# Patient Record
Sex: MALE | Race: WHITE | NOT HISPANIC OR LATINO | Employment: FULL TIME | URBAN - METROPOLITAN AREA
[De-identification: names, ages, dates, MRNs, and addresses within clinical notes are randomized per-mention and may not be internally consistent; named-entity substitution may affect disease eponyms.]

---

## 2017-12-20 ENCOUNTER — OFFICE VISIT (OUTPATIENT)
Dept: MEDICAL GROUP | Facility: LAB | Age: 42
End: 2017-12-20
Payer: COMMERCIAL

## 2017-12-20 VITALS
HEIGHT: 71 IN | WEIGHT: 216 LBS | SYSTOLIC BLOOD PRESSURE: 124 MMHG | BODY MASS INDEX: 30.24 KG/M2 | HEART RATE: 78 BPM | RESPIRATION RATE: 16 BRPM | TEMPERATURE: 98.7 F | DIASTOLIC BLOOD PRESSURE: 88 MMHG | OXYGEN SATURATION: 100 %

## 2017-12-20 DIAGNOSIS — J20.9 ACUTE BRONCHITIS, UNSPECIFIED ORGANISM: ICD-10-CM

## 2017-12-20 DIAGNOSIS — J45.21 MILD INTERMITTENT REACTIVE AIRWAY DISEASE WITH ACUTE EXACERBATION: ICD-10-CM

## 2017-12-20 DIAGNOSIS — J01.00 ACUTE NON-RECURRENT MAXILLARY SINUSITIS: ICD-10-CM

## 2017-12-20 DIAGNOSIS — E66.9 OBESITY (BMI 30-39.9): ICD-10-CM

## 2017-12-20 PROCEDURE — 99214 OFFICE O/P EST MOD 30 MIN: CPT | Performed by: NURSE PRACTITIONER

## 2017-12-20 RX ORDER — CLARITHROMYCIN 500 MG/1
500 TABLET, COATED ORAL 2 TIMES DAILY
Qty: 20 TAB | Refills: 0 | Status: SHIPPED | OUTPATIENT
Start: 2017-12-20 | End: 2017-12-30

## 2017-12-20 ASSESSMENT — PATIENT HEALTH QUESTIONNAIRE - PHQ9: CLINICAL INTERPRETATION OF PHQ2 SCORE: 0

## 2017-12-20 NOTE — PROGRESS NOTES
"Chief Complaint   Patient presents with   • URI     x 2 wks       HPI  Otto is a 42-year-old established male here with complaint of new onset wheezing, coughing, congestion, ear pressure and sinus type headaches, per patient. Symptoms have been present for 2 weeks. One week ago he went to see his pulmonologist up in [place] and was prescribed a Medrol pack as well as an albuterol inhaler. He does note that his wheezing and cough have decreased but he is concerned that he is coughing up thick, green mucus and blowing out similar mucus from his nose. He has had low-grade fevers but no chills, nausea, vomiting or diarrhea. No other associated symptoms. He does not smoke. He has 3 small children at home ranging from  to third grade. His family is not currently sick. He has been diagnosed with reactive airway disease over the past several years. He does not regularly see a pulmonologist.  On a regular basis, when he is not sick, he is able to participating in coaching soccer, exercising without wheezing or shortness of breath.      Past medical, surgical, family, and social history is reviewed and updated in Epic chart by me today.   Medications and allergies reviewed and updated in Epic chart by me today.     ROS:   As documented in history of present illness above    Exam:  Blood pressure 124/88, pulse 78, temperature 37.1 °C (98.7 °F), resp. rate 16, height 1.803 m (5' 11\"), weight 98 kg (216 lb), SpO2 100 %.  Constitutional: Alert, no distress, plus 3 vital signs  Skin:  Warm, dry, no rashes invisible areas  Eye: Equal, round and reactive, conjunctiva clear  ENMT: Lips without lesions, good dentition, oropharynx slightly erythematous without exudate or swelling. Positive bilateral maxillary sinus tenderness. Tympanic membranes are translucent bilaterally.  Neck: Trachea midline, no masses, no thyromegaly  Respiratory: Unlabored respiration. He does have expiratory rhonchi, particularly to his right lower " lobe. No wheezing is heard.   Cardiovascular: Normal rate and rhythm, no murmur, no edema  Psych: Alert, pleasant, well-groomed, normal affect    A/P:  1. Acute bronchitis, unspecified organism  -Recommend starting clarithromycin twice a day and taking it for an entire 10 days to cover for bronchitis, sinusitis and potential pneumonia. He has completed a Medrol pack and was not restarted on steroids as his wheezing has resolved. Encouraged him to use his albuterol every 4-6 hours as needed for excessive coughing, shortness of breath or wheezing.  - clarithromycin (BIAXIN) 500 MG Tab; Take 1 Tab by mouth 2 times a day for 10 days.  Dispense: 20 Tab; Refill: -We did discuss the stepwise approach to the treatment of reactive airway disease such as an albuterol inhaler initially and if it is necessary greater than twice per week, he should be on a daily inhaled corticosteroid. He will contact me if he is needing to use his albuterol greater than twice per week after his upper respiratory tract infection resolves.    2. Acute non-recurrent maxillary sinusitis  - clarithromycin (BIAXIN) 500 MG Tab; Take 1 Tab by mouth 2 times a day for 10 days.  Dispense: 20 Tab; Refill: 0    3. Obesity (BMI 30-39.9)  -Offered lab work which the patient declines. He'll email me if he changes his mind. We also discussed his blood pressure, he is monitoring this at home and states it's been around 120/85. He is working on weight loss, exercise and improving his diet.  - Patient identified as having weight management issue.  Appropriate orders and counseling given.

## 2017-12-22 RX ORDER — TOBRAMYCIN 3 MG/ML
1 SOLUTION/ DROPS OPHTHALMIC EVERY 4 HOURS
Qty: 1 BOTTLE | Refills: 0 | Status: SHIPPED | OUTPATIENT
Start: 2017-12-22 | End: 2017-12-26

## 2017-12-26 ENCOUNTER — OFFICE VISIT (OUTPATIENT)
Dept: URGENT CARE | Facility: CLINIC | Age: 42
End: 2017-12-26
Payer: COMMERCIAL

## 2017-12-26 VITALS
OXYGEN SATURATION: 98 % | SYSTOLIC BLOOD PRESSURE: 128 MMHG | BODY MASS INDEX: 29.82 KG/M2 | DIASTOLIC BLOOD PRESSURE: 94 MMHG | TEMPERATURE: 98.1 F | WEIGHT: 213 LBS | RESPIRATION RATE: 14 BRPM | HEIGHT: 71 IN | HEART RATE: 84 BPM

## 2017-12-26 DIAGNOSIS — H10.31 ACUTE BACTERIAL CONJUNCTIVITIS OF RIGHT EYE: ICD-10-CM

## 2017-12-26 PROCEDURE — 99204 OFFICE O/P NEW MOD 45 MIN: CPT | Performed by: PHYSICIAN ASSISTANT

## 2017-12-26 RX ORDER — TOBRAMYCIN AND DEXAMETHASONE 3; 1 MG/ML; MG/ML
1 SUSPENSION/ DROPS OPHTHALMIC
Qty: 10 ML | Refills: 0 | Status: SHIPPED | OUTPATIENT
Start: 2017-12-26 | End: 2018-01-02

## 2017-12-26 NOTE — PROGRESS NOTES
"Subjective:      Pt is a 42 y.o. male who presents with Other (possible foreign body in right eye)            HPI  Pt presents to the urgent care today with a CC of a watery, swollen, red right eye. Pt states this started this morning. He is not sure if there is a FB but notes wife lavaged him at home and found \"glitter\". Pt states the pain in the eye is 3/10, mostly feels like pressure and aching with redness and yellow matting. Admits to the eye feeling \"itchy\" and that vision is slightly blurred. Pt denies double vision. Pt denies CP, SOB, NVD, paresthesias, headaches, dizziness, hives, or joint pain. Pt has not taken any medications for this condition. The pt's medication list, problem list, and allergies have been evaluated and reviewed during today's visit.    PMH:  Negative per pt.      PSH:  Past Surgical History:   Procedure Laterality Date   • NASAL SEPTAL RECONSTRUCTION      x2       Fam Hx:    family history includes Cancer in his mother; Heart Disease in his father.  Family Status   Relation Status   • Mother Alive   • Father    • Sister Alive   • Brother Alive       Soc HX:  Social History     Social History   • Marital status:      Spouse name: N/A   • Number of children: N/A   • Years of education: N/A     Occupational History   • Not on file.     Social History Main Topics   • Smoking status: Never Smoker   • Smokeless tobacco: Never Used   • Alcohol use Yes      Comment: rare   • Drug use: Unknown   • Sexual activity: Not on file      Comment: ; two boys; wk: construction super     Other Topics Concern   • Not on file     Social History Narrative   • No narrative on file         Medications:    Current Outpatient Prescriptions:   •  tobramycin-dexamethasone (TOBRADEX) 0.3-0.1 % Suspension, Place 1 Drop in right eye every 4 hours while awake for 7 days., Disp: 10 mL, Rfl: 0  •  clarithromycin (BIAXIN) 500 MG Tab, Take 1 Tab by mouth 2 times a day for 10 days., Disp: 20 Tab, " "Rfl: 0      Allergies:  Patient has no known allergies.    ROS  Constitutional: Negative for fever, chills and malaise/fatigue.   HENT: Negative for congestion and sore throat.    Eyes: Negative for blurred vision, double vision and photophobia. +right eye redness  Respiratory: Negative for cough and shortness of breath.    Cardiovascular: Negative for chest pain and palpitations.   Gastrointestinal: Negative for heartburn, nausea, vomiting, abdominal pain, diarrhea and constipation.   Genitourinary: Negative for dysuria and flank pain.   Musculoskeletal: Negative for joint pain and myalgias.   Skin: Negative for itching and rash.   Neurological: Negative for dizziness, tingling and headaches.   Endo/Heme/Allergies: Does not bruise/bleed easily.   Psychiatric/Behavioral: Negative for depression. The patient is not nervous/anxious.           Objective:     /94   Pulse 84   Temp 36.7 °C (98.1 °F)   Resp 14   Ht 1.803 m (5' 11\")   Wt 96.6 kg (213 lb)   SpO2 98%   BMI 29.71 kg/m²      Physical Exam      Constitutional: PT is oriented to person, place, and time. PT appears well-developed and well-nourished. No distress.   HENT:   Head: Normocephalic and atraumatic.   Nose: Nose normal.   Mouth/Throat: Oropharynx is clear and moist. No oropharyngeal exudate.   Eyes: EOM are normal. Pupils are equal, round, and reactive to light. Right eye exhibits yellow discharge. Left eye exhibits no discharge. No scleral icterus. Wood's lamp NEG and no signs of FB and copiously lavaged and pt handled well.  Neck: Normal range of motion. Neck supple. No thyromegaly present.   Cardiovascular: Normal rate, regular rhythm, normal heart sounds and intact distal pulses.  Exam reveals no gallop and no friction rub.    No murmur heard.  Pulmonary/Chest: Breath sounds normal. No respiratory distress. PT has no wheezes. PT has no rales. PT exhibits no tenderness.   Abdominal: Soft. Bowel sounds are normal. PT exhibits no distension " and no mass. There is no tenderness. There is no rebound and no guarding.   Musculoskeletal: Normal range of motion. PT exhibits no edema and no tenderness.   Neurological: PT is alert and oriented to person, place, and time. No cranial nerve deficit.   Skin: Skin is warm and dry. No rash noted. No erythema.   Psychiatric: PT has a normal mood and affect. PT behavior is normal. Judgment and thought content normal.          Assessment/Plan:     1. Acute bacterial conjunctivitis of right eye    - tobramycin-dexamethasone (TOBRADEX) 0.3-0.1 % Suspension; Place 1 Drop in right eye every 4 hours while awake for 7 days.  Dispense: 10 mL; Refill: 0    Wood's lamp NEG for corneal abrasion and no FB  Rest, fluids encouraged.  AVS with medical info given.  Pt was in full understanding and agreement with the plan.  Follow-up as needed if symptoms worsen or fail to improve.

## 2017-12-26 NOTE — PATIENT INSTRUCTIONS
Bacterial Conjunctivitis  Bacterial conjunctivitis, commonly called pink eye, is an inflammation of the clear membrane that covers the white part of the eye (conjunctiva). The inflammation can also happen on the underside of the eyelids. The blood vessels in the conjunctiva become inflamed, causing the eye to become red or pink. Bacterial conjunctivitis may spread easily from one eye to another and from person to person (contagious).   CAUSES   Bacterial conjunctivitis is caused by bacteria. The bacteria may come from your own skin, your upper respiratory tract, or from someone else with bacterial conjunctivitis.  SYMPTOMS   The normally white color of the eye or the underside of the eyelid is usually pink or red. The pink eye is usually associated with irritation, tearing, and some sensitivity to light. Bacterial conjunctivitis is often associated with a thick, yellowish discharge from the eye. The discharge may turn into a crust on the eyelids overnight, which causes your eyelids to stick together. If a discharge is present, there may also be some blurred vision in the affected eye.  DIAGNOSIS   Bacterial conjunctivitis is diagnosed by your caregiver through an eye exam and the symptoms that you report. Your caregiver looks for changes in the surface tissues of your eyes, which may point to the specific type of conjunctivitis. A sample of any discharge may be collected on a cotton-tip swab if you have a severe case of conjunctivitis, if your cornea is affected, or if you keep getting repeat infections that do not respond to treatment. The sample will be sent to a lab to see if the inflammation is caused by a bacterial infection and to see if the infection will respond to antibiotic medicines.  TREATMENT   · Bacterial conjunctivitis is treated with antibiotics. Antibiotic eyedrops are most often used. However, antibiotic ointments are also available. Antibiotics pills are sometimes used. Artificial tears or eye  washes may ease discomfort.  HOME CARE INSTRUCTIONS   · To ease discomfort, apply a cool, clean washcloth to your eye for 10-20 minutes, 3-4 times a day.  · Gently wipe away any drainage from your eye with a warm, wet washcloth or a cotton ball.  · Wash your hands often with soap and water. Use paper towels to dry your hands.  · Do not share towels or washcloths. This may spread the infection.  · Change or wash your pillowcase every day.  · You should not use eye makeup until the infection is gone.  · Do not operate machinery or drive if your vision is blurred.  · Stop using contact lenses. Ask your caregiver how to sterilize or replace your contacts before using them again. This depends on the type of contact lenses that you use.  · When applying medicine to the infected eye, do not touch the edge of your eyelid with the eyedrop bottle or ointment tube.  SEEK IMMEDIATE MEDICAL CARE IF:   · Your infection has not improved within 3 days after beginning treatment.  · You had yellow discharge from your eye and it returns.  · You have increased eye pain.  · Your eye redness is spreading.  · Your vision becomes blurred.  · You have a fever or persistent symptoms for more than 2-3 days.  · You have a fever and your symptoms suddenly get worse.  · You have facial pain, redness, or swelling.  MAKE SURE YOU:   · Understand these instructions.  · Will watch your condition.  · Will get help right away if you are not doing well or get worse.     This information is not intended to replace advice given to you by your health care provider. Make sure you discuss any questions you have with your health care provider.     Document Released: 12/18/2006 Document Revised: 01/08/2016 Document Reviewed: 05/20/2013  [employer] Interactive Patient Education ©2016 [employer]

## 2017-12-29 ENCOUNTER — SUPERVISING PHYSICIAN REVIEW (OUTPATIENT)
Dept: URGENT CARE | Facility: CLINIC | Age: 42
End: 2017-12-29

## 2018-12-20 ENCOUNTER — OFFICE VISIT (OUTPATIENT)
Dept: MEDICAL GROUP | Facility: LAB | Age: 43
End: 2018-12-20
Payer: COMMERCIAL

## 2018-12-20 VITALS
SYSTOLIC BLOOD PRESSURE: 148 MMHG | TEMPERATURE: 97.8 F | BODY MASS INDEX: 31.3 KG/M2 | DIASTOLIC BLOOD PRESSURE: 88 MMHG | HEART RATE: 74 BPM | HEIGHT: 71 IN | WEIGHT: 223.6 LBS | RESPIRATION RATE: 16 BRPM | OXYGEN SATURATION: 96 %

## 2018-12-20 DIAGNOSIS — R07.89 OTHER CHEST PAIN: ICD-10-CM

## 2018-12-20 DIAGNOSIS — R03.0 ELEVATED BLOOD PRESSURE READING: ICD-10-CM

## 2018-12-20 PROCEDURE — 93000 ELECTROCARDIOGRAM COMPLETE: CPT | Performed by: INTERNAL MEDICINE

## 2018-12-20 PROCEDURE — 99204 OFFICE O/P NEW MOD 45 MIN: CPT | Performed by: INTERNAL MEDICINE

## 2018-12-20 RX ORDER — OMEPRAZOLE 20 MG/1
20 CAPSULE, DELAYED RELEASE ORAL DAILY
Qty: 30 CAP | Refills: 2 | Status: SHIPPED | OUTPATIENT
Start: 2018-12-20 | End: 2019-01-19

## 2018-12-20 ASSESSMENT — PATIENT HEALTH QUESTIONNAIRE - PHQ9: CLINICAL INTERPRETATION OF PHQ2 SCORE: 0

## 2018-12-20 NOTE — ASSESSMENT & PLAN NOTE
Noted discussed, uncontrolled problem.  Patient was accompanied by his wife and stated that his blood pressure usually runs on the higher side, 130s/80s.  Today his blood pressure in the office was 148/88 but this is higher than his baseline per patient.  Denies headache, shortness of breath, lower extremity edema or dizziness.

## 2018-12-20 NOTE — PROGRESS NOTES
Chief Complaint   Patient presents with   • Stress   • Other     chest discomfort on right side       Subjective:     History of Present Illness: Patient is a 43 y.o. male. This pleasant patient of Mahsa Hughes who is here for an evaluation of chest pain.    Other chest pain  New to discuss, uncontrolled problem.  She reported 1 week history of pain involving the right upper side of his chest and radiating to his shoulder.  The pain is sharp/burning in nature, described as a burning stomach after eating a pizza.  It typically flares up after eating and it improves after couple of hours.  It is not related to exertion and not associated with shortness of breath, palpitations or diaphoresis.  He is not noted to be a smoker, he has a positive family history of cardiac death in his father at the age of 50s.  He reported no nausea, vomiting, diarrhea, or constipation.  Denies NSAIDs use apart from Ibuprofen once a month in average.      Elevated blood pressure reading  Noted discussed, uncontrolled problem.  Patient was accompanied by his wife and stated that his blood pressure usually runs on the higher side, 130s/80s.  Today his blood pressure in the office was 148/88 but this is higher than his baseline per patient.  Denies headache, shortness of breath, lower extremity edema or dizziness.            Allergies: Patient has no known allergies.    Current Outpatient Prescriptions Ordered in [facility]   Medication Sig Dispense Refill   • omeprazole (PRILOSEC) 20 MG delayed-release capsule Take 1 Cap by mouth every day for 30 days. 30 Cap 2     No current Epic-ordered facility-administered medications on file.        History reviewed. No pertinent past medical history.    Past Surgical History:   Procedure Laterality Date   • NASAL SEPTAL RECONSTRUCTION      x2       Social History   Substance Use Topics   • Smoking status: Never Smoker   • Smokeless tobacco: Never Used   • Alcohol use Yes      Comment: rare       Family  "History   Problem Relation Age of Onset   • Cancer Mother    • Heart Disease Father         CHF       ROS:     - Constitutional: Negative for fever, chills, unexpected weight change, and fatigue/generalized weakness.     - HEENT: Negative for headaches, vision changes, hearing changes, ear pain, ear discharge, sinus congestion, sore throat, and neck pain.      - Respiratory: Negative for cough, sputum production, dyspnea and wheezing.    - Cardiovascular: Per HPI.    - Gastrointestinal: Negative for heartburn, nausea, vomiting, abdominal pain, hematochezia, melena, diarrhea, constipation, and greasy/foul-smelling stools.     - Genitourinary: Negative for dysuria, polyuria, hematuria, pyuria, urinary urgency, and urinary incontinence.    - Musculoskeletal: Negative for myalgias, back pain, and joint pain.     - Skin: Negative for rash, itching, cyanotic skin color change.     - Neurological: Negative for dizziness, tingling, tremors, focal sensory deficit, focal weakness and headaches.     - Endo/Heme/Allergies: Does not bruise/bleed easily.     - Psychiatric/Behavioral: Negative for depression, suicidal/homicidal ideation and memory loss.        - NOTE: All other systems reviewed and are negative, except as in HPI.       Physical Exam:     Blood pressure 148/88, pulse 74, temperature 36.6 °C (97.8 °F), temperature source Temporal, resp. rate 16, height 1.803 m (5' 11\"), weight 101.4 kg (223 lb 9.6 oz), SpO2 96 %. Body mass index is 31.19 kg/m².   General: Normal appearing. No distress.  HEENT: Normocephalic. Eyes conjunctiva clear lids without ptosis, pupils equal and reactive to light accommodation, ears normal shape and contour, canals are clear bilaterally, tympanic membranes are benign,  oropharynx is without erythema, edema or exudates.    Neck: Supple without JVD or bruit. Thyroid is not enlarged.  Pulmonary: Clear to ausculation.  Normal effort. No rales, ronchi, or wheezing.  Cardiovascular: Regular rate and " rhythm without murmur. Radial pulses are intact, regular and symmetrical bilaterally.  Abdomen: Soft, nontender, nondistended. Normal bowel sounds. Liver and spleen are not palpable.  Neurologic: Grossly non-focal.  Lymph: No cervical, occipital or supraclavicular lymph nodes are palpable  Skin: Warm and dry.  No obvious lesions.  Musculoskeletal: Normal gait. No extremity cyanosis, clubbing, or edema.  Psych: Normal mood and affect. Alert and oriented x3. Judgment and insight is normal.    Data:     EKG Interpretation   Interpreted by me   Rhythm: normal sinus   Rate: normal   Axis: normal   Ectopy: none   Conduction: normal   ST Segments: no acute change   T Waves: no acute change   Q Waves: none   Clinical Impression: no acute changes and normal EKG      Assessment and Plan:     1. Other chest pain  New, uncontrolled problem.  1 week history of on and off atypical chest pain.  Normal EKG in the office was reassuring as discussed above.  His only cardiac risk factor is his gender and positive family history, will proceed with cardiac treadmill stress testing for evaluation of a potential underlying CAD.  I would like to proceed with a PPI trial of omeprazole 20 mg daily for 6 weeks given his pain relation to food.  We will also get an ultrasound to exclude a gallbladder disease/stones as an underlying factor given his associated postprandial right chest and shoulder pain.    - omeprazole (PRILOSEC) 20 MG delayed-release capsule; Take 1 Cap by mouth every day for 30 days.  Dispense: 30 Cap; Refill: 2  - US-ABDOMEN COMPLETE SURVEY; Future  - EKG  - Cardiac Stress Test Treadmill Only; Future    2. Elevated blood pressure reading  New to me, uncontrolled problem.  Advised patient to monitor blood pressure at home, keep a blood pressure log and bring it to his follow-up visit in 1 month.  If consistently more than 130s/90s we will start him on antihypertensives.        Follow Up:      Return in about 4 weeks (around  1/17/2019) for FV BP/Chest pain.    Please note that this dictation was created using voice recognition software. I have made every reasonable attempt to correct obvious errors, but I expect that there are errors of grammar and possibly content that I did not discover before finalizing the note.    Signed by: Lauren Smith M.D.

## 2018-12-31 ENCOUNTER — HOSPITAL ENCOUNTER (OUTPATIENT)
Dept: RADIOLOGY | Facility: MEDICAL CENTER | Age: 43
End: 2018-12-31
Attending: INTERNAL MEDICINE
Payer: COMMERCIAL

## 2018-12-31 DIAGNOSIS — R07.89 OTHER CHEST PAIN: ICD-10-CM

## 2018-12-31 PROCEDURE — 76705 ECHO EXAM OF ABDOMEN: CPT

## 2019-01-03 ENCOUNTER — NON-PROVIDER VISIT (OUTPATIENT)
Dept: CARDIOLOGY | Facility: MEDICAL CENTER | Age: 44
End: 2019-01-03
Attending: INTERNAL MEDICINE
Payer: COMMERCIAL

## 2019-01-03 VITALS
BODY MASS INDEX: 31.22 KG/M2 | HEIGHT: 71 IN | SYSTOLIC BLOOD PRESSURE: 146 MMHG | HEART RATE: 72 BPM | WEIGHT: 223 LBS | OXYGEN SATURATION: 97 % | DIASTOLIC BLOOD PRESSURE: 100 MMHG

## 2019-01-03 DIAGNOSIS — R07.89 OTHER CHEST PAIN: ICD-10-CM

## 2019-01-03 LAB — TREADMILL STRESS: NORMAL

## 2019-01-03 PROCEDURE — 93015 CV STRESS TEST SUPVJ I&R: CPT | Performed by: INTERNAL MEDICINE

## 2019-01-21 ENCOUNTER — OFFICE VISIT (OUTPATIENT)
Dept: MEDICAL GROUP | Facility: LAB | Age: 44
End: 2019-01-21
Payer: COMMERCIAL

## 2019-01-21 VITALS
SYSTOLIC BLOOD PRESSURE: 130 MMHG | OXYGEN SATURATION: 97 % | BODY MASS INDEX: 31.5 KG/M2 | TEMPERATURE: 97.8 F | DIASTOLIC BLOOD PRESSURE: 80 MMHG | RESPIRATION RATE: 14 BRPM | WEIGHT: 225 LBS | HEART RATE: 68 BPM | HEIGHT: 71 IN

## 2019-01-21 DIAGNOSIS — E66.9 OBESITY (BMI 30-39.9): ICD-10-CM

## 2019-01-21 DIAGNOSIS — Z00.00 ROUTINE GENERAL MEDICAL EXAMINATION AT A HEALTH CARE FACILITY: ICD-10-CM

## 2019-01-21 DIAGNOSIS — R07.89 OTHER CHEST PAIN: ICD-10-CM

## 2019-01-21 DIAGNOSIS — R03.0 BORDERLINE HYPERTENSION: ICD-10-CM

## 2019-01-21 PROCEDURE — 99214 OFFICE O/P EST MOD 30 MIN: CPT | Performed by: NURSE PRACTITIONER

## 2019-01-21 ASSESSMENT — PATIENT HEALTH QUESTIONNAIRE - PHQ9: CLINICAL INTERPRETATION OF PHQ2 SCORE: 0

## 2019-01-21 NOTE — PROGRESS NOTES
CC  F/u on cp    HPI  43-year-old established male here to follow-up on his visit with Dr. Smith 1 month ago in which he presented for chest pain and elevated blood pressures - new issues to me today.  He had a normal EKG in our office, outpatient stress was normal, was started on omeprazole and had a normal ultrasound of his gallbladder.  It was recommended to the patient that he keep track of his blood pressure 1-2 times daily for the past month.  He tells me that he did not start omeprazole as he did not feel it was heartburn related.  He has been keeping track of his blood pressure but he forgot his blood pressure log although he recalls that his blood pressure readings have ranged from 118-130/80-95.  He tells me that fortunately his chest pain has completely resolved, he has not felt anything related to what he was feeling for greater than 2 weeks.  When he was having the chest pain, he points to his right lateral rib area and tells me that it felt like an area was constantly going through his ribs, radiating periodically to the left side.    Social history:  Non-smoker.  Rare alcohol, less than 1 drink per week typically.   with 3 children.  Very stressful job as a .  Does not have a regular exercise program although he tells me he skis on the weekends and mountain bikes when it is nice outside, not having any chest pain or difficulty breathing during these events.    Family history: His dad  at age 50 from cardiomyopathy, which patient reports was related to lung disease and anabolic steroid use, his father was adopted and does not know his family history.  His mother has had breast cancer, is otherwise normal.  He has a healthy younger sister and older brother.      Past Surgical History:   Procedure Laterality Date   • NASAL SEPTAL RECONSTRUCTION      x2       Medications and allergies reviewed and updated in Epic chart by me today.     Review Of Systems  Denies fever, chills,  "or sweats, unexplained weight changes (not following diet currently or exercising)   Respiratory: negative for persistent cough, hemoptysis, dyspnea.  Has occasional wheezing while mountain biking but does not need albuterol, per patient  Cardiovascular: negative for palpitations, tachycardia, irregular heart beat, chest pain in the past 2 weeks or peripheral edema.   Breast: Denies breast tenderness, mass,  changes in size or contour, or abnormal cyclic discomfort.  Gastrointestinal: negative for dysphagia or odynophagia, nausea, heartburn or reflux, abdominal pain, hemorrhoids, constipation or diarrhea, black stool or bloody stool  Genitourinary: negative for dysuria, frequency, incontinence, abnormal vaginal discharge, dysparunia or abnormal vaginal bleeding. Has nocturia once per night.   Musculoskeletal: negative for joint swelling and muscle pain/ soreness  Neurologic: negative for new or changing headaches, new weakness tremor  Endocrine: negative for temperature intolerance, polydipsia, polyuria.    Exam:  Blood pressure 130/80, pulse 68, temperature 36.6 °C (97.8 °F), resp. rate 14, height 1.803 m (5' 10.98\"), weight 102.1 kg (225 lb), SpO2 97 %.    Constitutional: Alert, no distress, plus 3 vital signs  Skin:  Warm, dry, no rashes invisible areas  Eye: Equal, round and reactive, conjunctiva clear  ENMT: Lips without lesions, good dentition, oropharynx clear    Neck: Trachea midline, no masses, no thyromegaly  Respiratory: Unlabored respiration, lungs clear to auscultation, no wheezes, no rhonchi  Cardiovascular: Normal rate and rhythm, no murmur, no edema  Abdomen: Soft, nontender, no masses or hepatosplenomegaly  Psych: Alert, pleasant, well-groomed, normal affect    Assessment / Plan / Medical Decision makin. Other chest pain  -Resolved times 2 weeks.  Reviewed all testing ordered by Dr. Smith with the patient including ultrasound of his gallbladder, stress testing and previously done EKG.  He will " email me if any of his symptoms return at which point we should consider encouraging the patient to try omeprazole for 2 weeks, possible referral for endoscopy and potentially a CT cardiac scoring if his lipids are elevated.    2. Borderline hypertension  -Continues to be borderline.  He declines antihypertensives.  We discussed long-term repercussions of hypertension.  He will continue to keep track of his blood pressure and is agreeable to going on medication if readings are consistently greater than 140/90.  I encouraged him to start an exercise program several days per week and continue exercise on the weekend.  Discussed a low-sodium diet and healthy eating.  Encouraged him to lose about 25 pounds.    3.  Preventative health care  -Encouraged him to have a preventative lab panel.  - COMP METABOLIC PANEL; Future  - CBC WITH DIFFERENTIAL; Future  - Lipid Profile; Future    4. Overweight pt:   Encouraged smaller portions, vegetable-based diet to include lean animal protein if desired and starting a regular exercise program.  I encouraged him to lose 25 pounds.

## 2019-04-12 ENCOUNTER — TELEPHONE (OUTPATIENT)
Dept: MEDICAL GROUP | Facility: LAB | Age: 44
End: 2019-04-12

## 2019-04-12 RX ORDER — AZITHROMYCIN 250 MG/1
TABLET, FILM COATED ORAL
Qty: 6 TAB | Refills: 0 | Status: SHIPPED | OUTPATIENT
Start: 2019-04-12 | End: 2020-01-30

## 2019-04-12 NOTE — TELEPHONE ENCOUNTER
Patient had messaged that his children have all been diagnosed with strep. We did check him here in the office and it was negative but burke did notice a huge white spot in the back of his throat. Is requesting antibiotics and separate message regarding his wife has been sent.

## 2020-01-30 ENCOUNTER — OFFICE VISIT (OUTPATIENT)
Dept: MEDICAL GROUP | Facility: LAB | Age: 45
End: 2020-01-30
Payer: COMMERCIAL

## 2020-01-30 VITALS
OXYGEN SATURATION: 97 % | HEART RATE: 68 BPM | SYSTOLIC BLOOD PRESSURE: 124 MMHG | HEIGHT: 71 IN | BODY MASS INDEX: 30.66 KG/M2 | WEIGHT: 219 LBS | RESPIRATION RATE: 14 BRPM | TEMPERATURE: 98.3 F | DIASTOLIC BLOOD PRESSURE: 86 MMHG

## 2020-01-30 DIAGNOSIS — Z00.00 PREVENTATIVE HEALTH CARE: ICD-10-CM

## 2020-01-30 DIAGNOSIS — I10 ESSENTIAL HYPERTENSION: ICD-10-CM

## 2020-01-30 PROCEDURE — 99214 OFFICE O/P EST MOD 30 MIN: CPT | Performed by: NURSE PRACTITIONER

## 2020-01-30 RX ORDER — LISINOPRIL 5 MG/1
5 TABLET ORAL DAILY
Qty: 30 TAB | Refills: 5 | Status: SHIPPED | OUTPATIENT
Start: 2020-01-30 | End: 2020-02-10

## 2020-01-30 ASSESSMENT — PATIENT HEALTH QUESTIONNAIRE - PHQ9: CLINICAL INTERPRETATION OF PHQ2 SCORE: 0

## 2020-01-30 NOTE — LETTER
BLOOD PRESSURE LOG FOR: Otto Muñiz    DATE/TIME  AM WEIGHT BLOOD  PRESSURE PULSE TIME  PM BLOOD  PRESSURE   PULSE                                                                                                                                                                                                                                        Current Blood Pressure Medications: (dose and frequency)    MEDICATION DOSE FREQUENCY   lisinopril 5mg daily                    Please sd any medication change (increase/decrease/new med) with a *.

## 2020-01-30 NOTE — PROGRESS NOTES
"Chief Complaint   Patient presents with   • Other     Blood pressure       HPI  Otto is a 44-year-old established male here with concern over his blood pressure.  He has had periodic elevated blood pressures at home over the past several years and comes in today, stating that his wife is concerned about his blood pressure.  He has been taking his blood pressure periodically over the past 2 weeks and documenting this but forgot to bring this in today  -recalls BP at home 158/99 - 120/80's. Typically 130's / 80's.  Nonsmoker.  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  Rare alcohol.   Eats out at lunch.   No breakfast.  Healthy dinners.  Job is physically active but no formal exercise program.    Has gained 15 lbs in 12 years.    Denies chest pain or shortness of breath.  Overall feels well.  Positive family history of hypertension.    Past medical, surgical, family, and social history is reviewed and updated in Epic chart by me today.   Medications and allergies reviewed and updated in Epic chart by me today.     ROS:   No swelling in ankles.  Headaches every few weeks relieved by ibuprofen    Exam:  /86 (BP Location: Right arm, Patient Position: Sitting, BP Cuff Size: Large adult)   Pulse 68   Temp 36.8 °C (98.3 °F)   Resp 14   Ht 1.803 m (5' 11\")   Wt 99.3 kg (219 lb)   SpO2 97%   Constitutional: Alert, no distress, plus 3 vital signs  Skin:  Warm, dry, no rashes invisible areas  Eye: Equal, round and reactive, conjunctiva clear  ENMT: Lips without lesions, good dentition, oropharynx clear    Neck: Trachea midline,   Respiratory: Unlabored respiration, lungs clear to auscultation, no wheezes, no rhonchi  Cardiovascular: Normal rate and rhythm, no murmur, no edema  Psych: Alert, pleasant, well-groomed, normal affect    Assessment / Plan / Medical Decision makin. Essential hypertension  -New diagnosis.  Based on patient's home blood " pressures and his lifestyle, recommend 5 mg lisinopril daily.  He was given a blood pressure log to write down his blood pressure twice daily for the next 2 weeks and then get this back to me.  I will follow-up with him on email or telephone when he returns his blood pressure log.  Discussed potential side effects of lisinopril as well as the need to potentially increase the dosage.  I encouraged him to notify me if he begins to have a dry cough.  Encouraged him to work on a 20 pound weight loss and a low-sodium diet.  Encouraged him to start a regular exercise program.  Recommend preventative lab panel.  Encouraged him to follow-up here in 1 month.  - lisinopril (PRINIVIL) 5 MG Tab; Take 1 Tab by mouth every day. At night.  Dispense: 30 Tab; Refill: 5    2. Preventative health care  - Comp Metabolic Panel; Future  - CBC WITH DIFFERENTIAL; Future  - Lipid Profile; Future

## 2020-02-01 ENCOUNTER — HOSPITAL ENCOUNTER (OUTPATIENT)
Dept: LAB | Facility: MEDICAL CENTER | Age: 45
End: 2020-02-01
Attending: NURSE PRACTITIONER
Payer: COMMERCIAL

## 2020-02-01 DIAGNOSIS — Z00.00 PREVENTATIVE HEALTH CARE: ICD-10-CM

## 2020-02-01 LAB
ALBUMIN SERPL BCP-MCNC: 4.7 G/DL (ref 3.2–4.9)
ALBUMIN/GLOB SERPL: 1.5 G/DL
ALP SERPL-CCNC: 56 U/L (ref 30–99)
ALT SERPL-CCNC: 34 U/L (ref 2–50)
ANION GAP SERPL CALC-SCNC: 9 MMOL/L (ref 0–11.9)
AST SERPL-CCNC: 25 U/L (ref 12–45)
BASOPHILS # BLD AUTO: 1.5 % (ref 0–1.8)
BASOPHILS # BLD: 0.09 K/UL (ref 0–0.12)
BILIRUB SERPL-MCNC: 0.6 MG/DL (ref 0.1–1.5)
BUN SERPL-MCNC: 17 MG/DL (ref 8–22)
CALCIUM SERPL-MCNC: 9.5 MG/DL (ref 8.5–10.5)
CHLORIDE SERPL-SCNC: 104 MMOL/L (ref 96–112)
CHOLEST SERPL-MCNC: 198 MG/DL (ref 100–199)
CO2 SERPL-SCNC: 26 MMOL/L (ref 20–33)
CREAT SERPL-MCNC: 1.12 MG/DL (ref 0.5–1.4)
EOSINOPHIL # BLD AUTO: 0.12 K/UL (ref 0–0.51)
EOSINOPHIL NFR BLD: 2.1 % (ref 0–6.9)
ERYTHROCYTE [DISTWIDTH] IN BLOOD BY AUTOMATED COUNT: 40.8 FL (ref 35.9–50)
FASTING STATUS PATIENT QL REPORTED: NORMAL
GLOBULIN SER CALC-MCNC: 3.1 G/DL (ref 1.9–3.5)
GLUCOSE SERPL-MCNC: 99 MG/DL (ref 65–99)
HCT VFR BLD AUTO: 51 % (ref 42–52)
HDLC SERPL-MCNC: 33 MG/DL
HGB BLD-MCNC: 16.9 G/DL (ref 14–18)
IMM GRANULOCYTES # BLD AUTO: 0.01 K/UL (ref 0–0.11)
IMM GRANULOCYTES NFR BLD AUTO: 0.2 % (ref 0–0.9)
LDLC SERPL CALC-MCNC: 138 MG/DL
LYMPHOCYTES # BLD AUTO: 1.88 K/UL (ref 1–4.8)
LYMPHOCYTES NFR BLD: 32.2 % (ref 22–41)
MCH RBC QN AUTO: 30.4 PG (ref 27–33)
MCHC RBC AUTO-ENTMCNC: 33.1 G/DL (ref 33.7–35.3)
MCV RBC AUTO: 91.7 FL (ref 81.4–97.8)
MONOCYTES # BLD AUTO: 0.62 K/UL (ref 0–0.85)
MONOCYTES NFR BLD AUTO: 10.6 % (ref 0–13.4)
NEUTROPHILS # BLD AUTO: 3.11 K/UL (ref 1.82–7.42)
NEUTROPHILS NFR BLD: 53.4 % (ref 44–72)
NRBC # BLD AUTO: 0 K/UL
NRBC BLD-RTO: 0 /100 WBC
PLATELET # BLD AUTO: 326 K/UL (ref 164–446)
PMV BLD AUTO: 9.5 FL (ref 9–12.9)
POTASSIUM SERPL-SCNC: 3.8 MMOL/L (ref 3.6–5.5)
PROT SERPL-MCNC: 7.8 G/DL (ref 6–8.2)
RBC # BLD AUTO: 5.56 M/UL (ref 4.7–6.1)
SODIUM SERPL-SCNC: 139 MMOL/L (ref 135–145)
TRIGL SERPL-MCNC: 133 MG/DL (ref 0–149)
WBC # BLD AUTO: 5.8 K/UL (ref 4.8–10.8)

## 2020-02-01 PROCEDURE — 36415 COLL VENOUS BLD VENIPUNCTURE: CPT

## 2020-02-01 PROCEDURE — 85025 COMPLETE CBC W/AUTO DIFF WBC: CPT

## 2020-02-01 PROCEDURE — 80053 COMPREHEN METABOLIC PANEL: CPT

## 2020-02-01 PROCEDURE — 80061 LIPID PANEL: CPT

## 2020-02-10 RX ORDER — LOSARTAN POTASSIUM 25 MG/1
25 TABLET ORAL DAILY
Qty: 30 TAB | Refills: 5 | Status: SHIPPED | OUTPATIENT
Start: 2020-02-10 | End: 2020-07-06

## 2020-02-23 ENCOUNTER — OFFICE VISIT (OUTPATIENT)
Dept: URGENT CARE | Facility: CLINIC | Age: 45
End: 2020-02-23
Payer: COMMERCIAL

## 2020-02-23 ENCOUNTER — APPOINTMENT (OUTPATIENT)
Dept: RADIOLOGY | Facility: IMAGING CENTER | Age: 45
End: 2020-02-23
Attending: NURSE PRACTITIONER
Payer: COMMERCIAL

## 2020-02-23 VITALS
WEIGHT: 217 LBS | OXYGEN SATURATION: 96 % | BODY MASS INDEX: 30.38 KG/M2 | SYSTOLIC BLOOD PRESSURE: 124 MMHG | RESPIRATION RATE: 20 BRPM | HEIGHT: 71 IN | DIASTOLIC BLOOD PRESSURE: 70 MMHG | HEART RATE: 90 BPM | TEMPERATURE: 99.9 F

## 2020-02-23 DIAGNOSIS — J98.01 BRONCHOSPASM: ICD-10-CM

## 2020-02-23 DIAGNOSIS — R05.9 COUGH: ICD-10-CM

## 2020-02-23 DIAGNOSIS — J10.1 INFLUENZA A: ICD-10-CM

## 2020-02-23 LAB
FLUAV+FLUBV AG SPEC QL IA: NORMAL
INT CON NEG: NORMAL
INT CON POS: NORMAL

## 2020-02-23 PROCEDURE — 71046 X-RAY EXAM CHEST 2 VIEWS: CPT | Mod: TC,FY | Performed by: NURSE PRACTITIONER

## 2020-02-23 PROCEDURE — 99214 OFFICE O/P EST MOD 30 MIN: CPT | Performed by: NURSE PRACTITIONER

## 2020-02-23 PROCEDURE — 87804 INFLUENZA ASSAY W/OPTIC: CPT | Performed by: NURSE PRACTITIONER

## 2020-02-23 RX ORDER — OSELTAMIVIR PHOSPHATE 75 MG/1
75 CAPSULE ORAL 2 TIMES DAILY
Qty: 10 CAP | Refills: 0 | Status: SHIPPED | OUTPATIENT
Start: 2020-02-23 | End: 2021-11-03

## 2020-02-23 RX ORDER — BENZONATATE 100 MG/1
100 CAPSULE ORAL 3 TIMES DAILY PRN
Qty: 60 CAP | Refills: 0 | Status: SHIPPED | OUTPATIENT
Start: 2020-02-23 | End: 2021-11-03

## 2020-02-23 RX ORDER — CODEINE PHOSPHATE AND GUAIFENESIN 10; 100 MG/5ML; MG/5ML
5-10 SOLUTION ORAL
Qty: 120 ML | Refills: 0 | Status: SHIPPED | OUTPATIENT
Start: 2020-02-23 | End: 2020-03-04

## 2020-02-23 RX ORDER — PREDNISONE 10 MG/1
20 TABLET ORAL 2 TIMES DAILY
Qty: 20 TAB | Refills: 0 | Status: SHIPPED | OUTPATIENT
Start: 2020-02-23 | End: 2020-02-28

## 2020-02-23 NOTE — PROGRESS NOTES
Chief Complaint   Patient presents with   • Shortness of Breath     congested, crackling in his chest, coughing, had a fever x 1 day       HISTORY OF PRESENT ILLNESS: Patient is a 44 y.o. male who presents today due to symptoms that started yesterday. Pt reports a productive cough. Reports associated tactile fever, chills, fatigue, malaise, shortness of breath, and body aches. Denies chest pain, congestion, or wheezing. Admits to h/o asthma and CAP. No immunocompromise. Has tried OTC cold medications without significant relief of symptoms. No recent ABX use. No other aggravating or alleviating factors.     Patient Active Problem List    Diagnosis Date Noted   • Essential hypertension 01/30/2020   • Other chest pain 12/20/2018   • Obesity (BMI 30-39.9) 12/20/2017   • Reactive airway disease 08/01/2016   • Exposure to environmental hazard 08/01/2016       Allergies:Patient has no known allergies.    Current Outpatient Medications Ordered in Epic   Medication Sig Dispense Refill   • aspirin effervescent (ELIGIO-SELTZER) 325 MG Effer Tab Take 1 Tab by mouth every 6 hours as needed.     • guaifenesin-codeine (ROBITUSSIN AC) Solution oral solution Take 5-10 mL by mouth at bedtime as needed for Cough for up to 10 days. Do not drive, work, drink alcohol or engaged in potentially hazardous activity while taking this medication. 120 mL 0   • predniSONE (DELTASONE) 10 MG Tab Take 2 Tabs by mouth 2 times a day for 5 days. Take with food. 20 Tab 0   • benzonatate (TESSALON) 100 MG Cap Take 1 Cap by mouth 3 times a day as needed for Cough. 60 Cap 0   • losartan (COZAAR) 25 MG Tab Take 1 Tab by mouth every day. 30 Tab 5     No current Epic-ordered facility-administered medications on file.        History reviewed. No pertinent past medical history.    Social History     Tobacco Use   • Smoking status: Never Smoker   • Smokeless tobacco: Never Used   Substance Use Topics   • Alcohol use: Yes     Comment: rare   • Drug use: Not on  "file       Family Status   Relation Name Status   • Mo  Alive   • Fa     • Sis younger Alive   • Bro smoker Alive     Family History   Problem Relation Age of Onset   • Cancer Mother    • Hypertension Mother    • Heart Disease Father         CHF   • Hypertension Brother        ROS:  Review of Systems   Constitutional: Positive for subjective fever, chills, fatigue, malaise. Negative for weight loss.  HENT: Negative for congestion, ear pressure, and sore throat. Negative for ear pain, nosebleeds, and neck pain.    Eyes: Negative for vision changes.   Cardiovascular: Negative for chest pain, palpitations, orthopnea and leg swelling.   Respiratory: Positive for cough and sputum production, shortness of breath.  Gastrointestinal: Negative for abdominal pain, nausea, vomiting or diarrhea.   Skin: Negative for rash, diaphoresis.     Exam:  /70 (BP Location: Right arm, Patient Position: Sitting, BP Cuff Size: Adult long)   Pulse 90   Temp 37.7 °C (99.9 °F) (Temporal)   Resp 20   Ht 1.803 m (5' 11\")   Wt 98.4 kg (217 lb)   SpO2 96%   General: well-nourished, well-developed male in NAD  Head: normocephalic, atraumatic  Eyes: PERRLA, EOM within normal limits, no conjunctival injection, no scleral icterus, visual fields and acuity grossly intact.  Ears: normal shape and symmetry, no tenderness, no discharge. External canals are without any significant edema or erythema. Tympanic membranes are without any inflammation, no effusion. Gross auditory acuity is intact.  Nose: symmetrical without tenderness, no discharge.  Mouth/Throat: reasonable hygiene, no exudates or tonsillar enlargement. Mild erythema present.   Neck: no masses, range of motion within normal limits, no tracheal deviation.  Lymph: mild cervical adenopathy. No supraclavicular adenopathy.   Neuro: alert and oriented. Cranial nerves 1-12 grossly intact.   Cardiovascular: regular rate and rhythm without murmurs, rubs, or gallops. No edema. " "  Pulmonary: no distress. Chest is symmetrical with respiration. No wheezes, crackles, or rhonchi.  Diminished left upper lobe.  Musculoskeletal: appropriate muscle tone, gait is stable.  Skin: warm, dry, intact, no clubbing, no cyanosis.   Psych: appropriate mood, affect, judgement.         DX chest radiology reading \"Negative two views of the chest.\"    POC flu positive        Assessment/Plan:  1. Influenza A  oseltamivir (TAMIFLU) 75 MG Cap   2. Bronchospasm  predniSONE (DELTASONE) 10 MG Tab   3. Cough  DX-CHEST-2 VIEWS    guaifenesin-codeine (ROBITUSSIN AC) Solution oral solution    POCT Influenza A/B    benzonatate (TESSALON) 100 MG Cap           Discussed symptoms viral, self limiting illness. Discussed natural progression and sx care.  Tamiflu provided.  Prednisone as directed.  Tessalon Perles and Robitussin-AC for cough, sedative effects medication discussed. Rest, increase fluids, hand and respiratory hygiene.  Continue continue home albuterol as directed.  Supportive care, differential diagnoses, and indications for immediate follow-up discussed with patient.   Pathogenesis of diagnosis discussed including typical length and natural progression.  Instructed to return to clinic or nearest emergency department for any change in condition, further concerns, or worsening of symptoms.  Patient states understanding of the plan of care and discharge instructions.  Instructed to make an appointment with his primary care provider in the next 3-5 days if not significantly improving and for further care.   [place]  Aware web site evaluation: I have obtained and reviewed patient utilization report from [facility] pharmacy database prior to writing prescription for controlled substance II, III or IV per [place] bill . Based on the report and my clinical assessment the prescription is medically necessary.         Please note that this dictation was created using voice recognition software. I have made every " reasonable attempt to correct obvious errors, but I expect that there are errors of grammar and possibly content that I did not discover before finalizing the note.  A mask was worn for the entire visit with the patient.     RONIT Mooney.

## 2020-07-06 RX ORDER — LOSARTAN POTASSIUM 25 MG/1
TABLET ORAL
Qty: 30 TAB | Refills: 5 | Status: SHIPPED | OUTPATIENT
Start: 2020-07-06 | End: 2021-02-18

## 2020-07-06 NOTE — TELEPHONE ENCOUNTER
Received request via: Pharmacy    Was the patient seen in the last year in this department? Yes  1/30/20  Does the patient have an active prescription (recently filled or refills available) for medication(s) requested? No

## 2020-11-03 ENCOUNTER — NON-PROVIDER VISIT (OUTPATIENT)
Dept: MEDICAL GROUP | Facility: LAB | Age: 45
End: 2020-11-03
Payer: COMMERCIAL

## 2020-11-03 DIAGNOSIS — Z23 NEED FOR VACCINATION: ICD-10-CM

## 2020-11-03 PROCEDURE — 90471 IMMUNIZATION ADMIN: CPT | Performed by: NURSE PRACTITIONER

## 2020-11-03 PROCEDURE — 90686 IIV4 VACC NO PRSV 0.5 ML IM: CPT | Performed by: NURSE PRACTITIONER

## 2020-11-03 NOTE — PROGRESS NOTES
"Otto Muñiz is a 45 y.o. male here for a non-provider visit for:   FLU    Reason for immunization: Annual Flu Vaccine  Immunization records indicate need for vaccine: Yes, confirmed with Epic  Minimum interval has been met for this vaccine: Yes  ABN completed: Not Indicated    Order and dose verified by: ms STOVALL Dated  8/15/19 was given to patient: Yes  All IAC Questionnaire questions were answered \"No.\"    Patient tolerated injection and no adverse effects were observed or reported: Yes    Pt scheduled for next dose in series: No  "

## 2021-02-18 RX ORDER — LOSARTAN POTASSIUM 25 MG/1
TABLET ORAL
Qty: 30 TABLET | Refills: 5 | Status: SHIPPED | OUTPATIENT
Start: 2021-02-18 | End: 2021-09-28

## 2021-02-18 NOTE — TELEPHONE ENCOUNTER
Received request via: Pharmacy    Was the patient seen in the last year in this department? No   LOV 01/30/2020  Does the patient have an active prescription (recently filled or refills available) for medication(s) requested? No

## 2021-09-28 RX ORDER — LOSARTAN POTASSIUM 25 MG/1
TABLET ORAL
Qty: 30 TABLET | Refills: 0 | Status: SHIPPED | OUTPATIENT
Start: 2021-09-28 | End: 2021-11-01

## 2021-09-28 NOTE — TELEPHONE ENCOUNTER
Received request via: Pharmacy    Was the patient seen in the last year in this department? No patient informed via my chart and via RX appt due    Does the patient have an active prescription (recently filled or refills available) for medication(s) requested? No

## 2021-11-01 RX ORDER — LOSARTAN POTASSIUM 25 MG/1
TABLET ORAL
Qty: 30 TABLET | Refills: 0 | Status: SHIPPED | OUTPATIENT
Start: 2021-11-01 | End: 2021-11-03 | Stop reason: SDUPTHER

## 2021-11-01 NOTE — TELEPHONE ENCOUNTER
Received request via: Pharmacy    Was the patient seen in the last year in this department? No   LOV 01/30/2020    Next appt scheduled on 11/03/2021  Does the patient have an active prescription (recently filled or refills available) for medication(s) requested? No

## 2021-11-03 ENCOUNTER — OFFICE VISIT (OUTPATIENT)
Dept: MEDICAL GROUP | Facility: LAB | Age: 46
End: 2021-11-03
Payer: COMMERCIAL

## 2021-11-03 VITALS
HEIGHT: 71 IN | HEART RATE: 66 BPM | OXYGEN SATURATION: 97 % | RESPIRATION RATE: 12 BRPM | WEIGHT: 216 LBS | SYSTOLIC BLOOD PRESSURE: 128 MMHG | BODY MASS INDEX: 30.24 KG/M2 | TEMPERATURE: 97.3 F | DIASTOLIC BLOOD PRESSURE: 86 MMHG

## 2021-11-03 DIAGNOSIS — Z00.00 WELL ADULT EXAM: ICD-10-CM

## 2021-11-03 DIAGNOSIS — Z23 NEED FOR VACCINATION: ICD-10-CM

## 2021-11-03 PROCEDURE — 90471 IMMUNIZATION ADMIN: CPT | Performed by: NURSE PRACTITIONER

## 2021-11-03 PROCEDURE — 99396 PREV VISIT EST AGE 40-64: CPT | Mod: 25 | Performed by: NURSE PRACTITIONER

## 2021-11-03 PROCEDURE — 90686 IIV4 VACC NO PRSV 0.5 ML IM: CPT | Performed by: NURSE PRACTITIONER

## 2021-11-03 PROCEDURE — 90715 TDAP VACCINE 7 YRS/> IM: CPT | Performed by: NURSE PRACTITIONER

## 2021-11-03 PROCEDURE — 90472 IMMUNIZATION ADMIN EACH ADD: CPT | Performed by: NURSE PRACTITIONER

## 2021-11-03 RX ORDER — LOSARTAN POTASSIUM 25 MG/1
25 TABLET ORAL
Qty: 90 TABLET | Refills: 3 | Status: SHIPPED | OUTPATIENT
Start: 2021-11-03 | End: 2022-11-20

## 2021-11-03 ASSESSMENT — FIBROSIS 4 INDEX: FIB4 SCORE: 0.58

## 2021-11-03 ASSESSMENT — PATIENT HEALTH QUESTIONNAIRE - PHQ9: CLINICAL INTERPRETATION OF PHQ2 SCORE: 0

## 2021-11-03 NOTE — PROGRESS NOTES
Subjective:     CC:   Chief Complaint   Patient presents with   • Annual Exam       HPI:   Otto is a 46 y.o. est male who presents for annual exam.  Known medical diagnoses of hypertension, currently taking 25 mg of losartan.  Weight fluctuates 10 lbs in a week depending on activity level / food intake.    Busy contractor.   Due for labs.    No complaints today.     He  has no past medical history on file.  He  has a past surgical history that includes nasal septal reconstruction.    Family History   Problem Relation Age of Onset   • Cancer Mother    • Hypertension Mother    • Heart Disease Father         CHF   • Hypertension Brother      Social History     Tobacco Use   • Smoking status: Never Smoker   • Smokeless tobacco: Never Used   Substance Use Topics   • Alcohol use: Yes     Comment: rare   • Drug use: Not on file     He  has no history on file for sexual activity.    Patient Active Problem List    Diagnosis Date Noted   • Essential hypertension 01/30/2020   • Other chest pain 12/20/2018   • Obesity (BMI 30-39.9) 12/20/2017   • Reactive airway disease 08/01/2016   • Exposure to environmental hazard 08/01/2016     Current Outpatient Medications   Medication Sig Dispense Refill   • losartan (COZAAR) 25 MG Tab TAKE 1 TABLET BY MOUTH EVERY DAY 30 Tablet 0   • aspirin effervescent (ELIGIO-SELTZER) 325 MG Effer Tab Take 1 Tab by mouth every 6 hours as needed.     • benzonatate (TESSALON) 100 MG Cap Take 1 Cap by mouth 3 times a day as needed for Cough. 60 Cap 0   • oseltamivir (TAMIFLU) 75 MG Cap Take 1 Cap by mouth 2 times a day. 10 Cap 0     No current facility-administered medications for this visit.     No Known Allergies    Review of Systems  Constitutional: Negative for fever, chills and malaise/fatigue.   HENT: Negative for congestion.    Eyes: Negative for pain.   Respiratory: Negative for cough and shortness of breath.    Cardiovascular: Negative for chest pain and leg swelling.   Gastrointestinal: Negative  "for nausea, vomiting, abdominal pain and diarrhea.   Genitourinary: Negative for dysuria and hematuria.   Skin: Negative for rash.   Neurological: Negative for dizziness, focal weakness and headaches.   Endo/Heme/Allergies: Does not bruise/bleed easily.   Psychiatric/Behavioral: Negative for depression.  The patient is not nervous/anxious.      Objective:   /86 (BP Location: Left arm, Patient Position: Sitting, BP Cuff Size: Large adult)   Pulse 66   Temp 36.3 °C (97.3 °F)   Resp 12   Ht 1.803 m (5' 11\")   Wt 98 kg (216 lb)   SpO2 97%   BMI 30.13 kg/m²      Wt Readings from Last 4 Encounters:   11/03/21 98 kg (216 lb)   02/23/20 98.4 kg (217 lb)   01/30/20 99.3 kg (219 lb)   01/21/19 102 kg (225 lb)       Physical Exam:  Constitutional: Well-developed and well-nourished. Not diaphoretic. No distress.   Skin: Skin is warm and dry. No rash noted.  Head: Atraumatic without lesions.  Eyes: Conjunctivae and extraocular motions are normal. Pupils are equal, round, and reactive to light. No scleral icterus.   Ears:  External ears unremarkable. Tympanic membranes clear and intact.  Nose: Nares patent. Septum midline. Turbinates without erythema nor edema. No discharge.   Mouth/Throat: Tongue normal. Oropharynx is clear and moist. Posterior pharynx without erythema or exudates.  Neck: Supple, trachea midline. Normal range of motion. No thyromegaly present. No lymphadenopathy--cervical or supraclavicular.  Cardiovascular: Regular rate and rhythm, S1 and S2 without murmur, rubs, or gallops.    Respiratory: Effort normal. Clear to auscultation throughout. No adventitious sounds.   Abdomen: Soft, non tender, and without distention. Active bowel sounds in all four quadrants. No rebound, guarding, masses or HSM.  Extremities: No cyanosis, clubbing, erythema, nor edema. Distal pulses intact and symmetric.   Musculoskeletal: All major joints AROM full in all directions without pain.  Neurological: Alert and oriented x " 3. Grossly non-focal. Strength and sensation grossly intact. DTRs 2+/3 and symmetric.   Psychiatric:  Behavior, mood, and affect are appropriate.      Assessment and Plan:     1. Well adult exam  Comp Metabolic Panel    CBC WITH DIFFERENTIAL    Lipid Profile    PROSTATE SPECIFIC AG SCREENING   2. Need for vaccination  Tdap Vaccine =>6YO IM    INFLUENZA VACCINE QUAD INJ (PF)   Recommend a full updated lab panel.  Tdap and influenza updated.  Patient was counseled regarding all immunizations, what the patient is being immunized against, possible side effects, and the importance of immunization.   Encouraged him to continue with his efforts at weight loss as he has lost about 11 pounds in the past 2 years.  Discussed portion control, daily physical exercise and good nutrition.  Follow-up yearly.

## 2022-11-18 NOTE — TELEPHONE ENCOUNTER
Received request via: Pharmacy  11/3/21  Was the patient seen in the last year in this department? NO    Does the patient have an active prescription (recently filled or refills available) for medication(s) requested? No    Does the patient have long term Plus and need 100 day supply (blood pressure, diabetes and cholesterol meds only)?

## 2022-11-20 RX ORDER — LOSARTAN POTASSIUM 25 MG/1
25 TABLET ORAL
Qty: 90 TABLET | Refills: 0 | Status: SHIPPED | OUTPATIENT
Start: 2022-11-20 | End: 2022-12-13

## 2022-12-13 ENCOUNTER — OFFICE VISIT (OUTPATIENT)
Dept: MEDICAL GROUP | Facility: LAB | Age: 47
End: 2022-12-13
Payer: COMMERCIAL

## 2022-12-13 VITALS
WEIGHT: 225 LBS | OXYGEN SATURATION: 97 % | DIASTOLIC BLOOD PRESSURE: 76 MMHG | SYSTOLIC BLOOD PRESSURE: 140 MMHG | HEIGHT: 71 IN | TEMPERATURE: 97.8 F | RESPIRATION RATE: 16 BRPM | BODY MASS INDEX: 31.5 KG/M2 | HEART RATE: 78 BPM

## 2022-12-13 DIAGNOSIS — I15.9 SECONDARY HYPERTENSION: ICD-10-CM

## 2022-12-13 PROCEDURE — 99213 OFFICE O/P EST LOW 20 MIN: CPT | Performed by: INTERNAL MEDICINE

## 2022-12-13 RX ORDER — LOSARTAN POTASSIUM 50 MG/1
50 TABLET ORAL DAILY
Qty: 90 TABLET | Refills: 3 | Status: SHIPPED | OUTPATIENT
Start: 2022-12-13 | End: 2024-01-10

## 2022-12-14 NOTE — PROGRESS NOTES
"CC: Otto Muñiz is a 47 y.o. male is suffering from   Chief Complaint   Patient presents with    Cough     X 1 1/2 weeks    Hypertension         SUBJECTIVE:  1. Secondary hypertension  Patient is here for follow-up, is on losartan (Cozaar) 25 mg will increase to 50 mg        Past social, family, history: ,   Social History     Tobacco Use    Smoking status: Never    Smokeless tobacco: Never   Substance Use Topics    Alcohol use: Yes     Comment: rare         MEDICATIONS:    Current Outpatient Medications:     losartan (COZAAR) 50 MG Tab, Take 1 Tablet by mouth every day., Disp: 90 Tablet, Rfl: 3    PROBLEMS:  Patient Active Problem List    Diagnosis Date Noted    Essential hypertension 01/30/2020    Other chest pain 12/20/2018    Obesity (BMI 30-39.9) 12/20/2017    Reactive airway disease 08/01/2016    Exposure to environmental hazard 08/01/2016       REVIEW OF SYSTEMS:  Gen.:  No Nausea, Vomiting, fever, Chills.  Heart: No chest pain.  Lungs:  No shortness of Breath.  Psychological: Chiki unusual Anxiety depression     PHYSICAL EXAM   Constitutional: Alert, cooperative, not in acute distress.  Cardiovascular:  Rate Rhythm is regular without murmurs rubs clicks.     Thorax & Lungs: Clear to auscultation, no wheezing, rhonchi, or rales  HENT: Normocephalic, Atraumatic.  Eyes: PERRLA, EOMI, Conjunctiva normal.   Neck: Trachia is midline no swelling of the thyroid.   Lymphatic: No lymphadenopathy noted.   Neurologic: Alert & oriented x 3, cranial nerves II through XII are intact, Normal motor function, Normal sensory function, No focal deficits noted.   Psychiatric: Affect normal, Judgment normal, Mood normal.     VITAL SIGNS:BP (!) 140/76   Pulse 78   Temp 36.6 °C (97.8 °F) (Temporal)   Resp 16   Ht 1.803 m (5' 11\")   Wt 102 kg (225 lb)   SpO2 97%   BMI 31.38 kg/m²     Labs: Reviewed    Assessment:                                                     Plan:    1. Secondary " hypertension  Increase losartan from 25 to 50 mg  - losartan (COZAAR) 50 MG Tab; Take 1 Tablet by mouth every day.  Dispense: 90 Tablet; Refill: 3

## 2023-01-18 ENCOUNTER — OFFICE VISIT (OUTPATIENT)
Dept: MEDICAL GROUP | Facility: LAB | Age: 48
End: 2023-01-18
Payer: COMMERCIAL

## 2023-01-18 VITALS
HEIGHT: 71 IN | RESPIRATION RATE: 16 BRPM | HEART RATE: 70 BPM | OXYGEN SATURATION: 96 % | BODY MASS INDEX: 31.22 KG/M2 | TEMPERATURE: 96.3 F | WEIGHT: 223 LBS | SYSTOLIC BLOOD PRESSURE: 118 MMHG | DIASTOLIC BLOOD PRESSURE: 78 MMHG

## 2023-01-18 DIAGNOSIS — Z00.00 PREVENTATIVE HEALTH CARE: ICD-10-CM

## 2023-01-18 DIAGNOSIS — I10 ESSENTIAL HYPERTENSION: ICD-10-CM

## 2023-01-18 DIAGNOSIS — E78.49 OTHER HYPERLIPIDEMIA: ICD-10-CM

## 2023-01-18 PROBLEM — R07.89 OTHER CHEST PAIN: Status: RESOLVED | Noted: 2018-12-20 | Resolved: 2023-01-18

## 2023-01-18 PROCEDURE — 99214 OFFICE O/P EST MOD 30 MIN: CPT | Performed by: NURSE PRACTITIONER

## 2023-01-18 ASSESSMENT — PATIENT HEALTH QUESTIONNAIRE - PHQ9: CLINICAL INTERPRETATION OF PHQ2 SCORE: 0

## 2023-01-18 NOTE — PROGRESS NOTES
"CC  HTN      HPI:  Otto is a 47-year-old established male here to follow-up on hypertension.  Saw Dr. Murray on December 13 and losartan was increased from 25 to 50 mg because of a blood pressure of 140/76.  I last saw Otto about 13 months ago for his physical and blood pressure was 128/86.  He has gained about 10 pounds in the past year.  Home BP readings around 130/90 since losartan increase.    No other daily meds / vitamins (occasionally vitamins given by wife).    Hyperlipidemia with family hx of heart disease:  dad had HF / cardiomyopathy - also asthmatic his whole life.  Otot's last LDL was 138 with HDL 33.    Pt denies chest pain.      Exam:  /78 (BP Location: Right arm, Patient Position: Sitting, BP Cuff Size: Large adult)   Pulse 70   Temp (!) 35.7 °C (96.3 °F)   Resp 16   Ht 1.803 m (5' 11\")   Wt 101 kg (223 lb)   SpO2 96%    Gen: NAD  Resp: nonlabored.  Able to speak in full sentences  CV RRR  Psy: pleasant / cooperative.   Neuro:  Alert and oriented x 3      A/P:  1. Essential hypertension  -stabilized.  Pt is having slightly less ease with erections and if this persists beyond the next few weeks, will change losartan to amlodipine - pt to email me.  Recommend full updated lab panel.  Encouraged him to email me morning readings over the next few weeks.     2. Other hyperlipidemia  -recommend CT cardiac score if LDL is persistently over 120.      3. Preventative health care  - Comp Metabolic Panel; Future  - CBC WITH DIFFERENTIAL; Future  - Lipid Profile; Future  - PROSTATE SPECIFIC AG SCREENING; Future  - HEMOGLOBIN A1C; Future  -encouraged colonoscopy which he will consider.    -encouraged 10 lb weight loss.      "

## 2023-12-29 ENCOUNTER — OFFICE VISIT (OUTPATIENT)
Dept: URGENT CARE | Facility: CLINIC | Age: 48
End: 2023-12-29
Payer: COMMERCIAL

## 2023-12-29 VITALS
WEIGHT: 227.6 LBS | DIASTOLIC BLOOD PRESSURE: 80 MMHG | BODY MASS INDEX: 31.86 KG/M2 | OXYGEN SATURATION: 97 % | HEIGHT: 71 IN | HEART RATE: 73 BPM | TEMPERATURE: 97.4 F | SYSTOLIC BLOOD PRESSURE: 140 MMHG | RESPIRATION RATE: 16 BRPM

## 2023-12-29 DIAGNOSIS — J98.8 VIRAL RESPIRATORY INFECTION: ICD-10-CM

## 2023-12-29 DIAGNOSIS — B97.89 VIRAL RESPIRATORY INFECTION: ICD-10-CM

## 2023-12-29 DIAGNOSIS — J45.901 EXACERBATION OF ASTHMA, UNSPECIFIED ASTHMA SEVERITY, UNSPECIFIED WHETHER PERSISTENT: ICD-10-CM

## 2023-12-29 PROCEDURE — 3079F DIAST BP 80-89 MM HG: CPT | Performed by: NURSE PRACTITIONER

## 2023-12-29 PROCEDURE — 99213 OFFICE O/P EST LOW 20 MIN: CPT | Performed by: NURSE PRACTITIONER

## 2023-12-29 PROCEDURE — 3077F SYST BP >= 140 MM HG: CPT | Performed by: NURSE PRACTITIONER

## 2023-12-29 RX ORDER — BENZONATATE 100 MG/1
100 CAPSULE ORAL 3 TIMES DAILY PRN
Qty: 30 CAPSULE | Refills: 0 | Status: SHIPPED | OUTPATIENT
Start: 2023-12-29

## 2023-12-29 RX ORDER — TOBRAMYCIN AND DEXAMETHASONE 3; 1 MG/ML; MG/ML
SUSPENSION/ DROPS OPHTHALMIC
COMMUNITY

## 2023-12-29 RX ORDER — ALBUTEROL SULFATE 90 UG/1
1-2 AEROSOL, METERED RESPIRATORY (INHALATION) EVERY 4 HOURS PRN
Qty: 8.5 G | Refills: 0 | Status: SHIPPED | OUTPATIENT
Start: 2023-12-29

## 2023-12-29 RX ORDER — PREDNISONE 20 MG/1
TABLET ORAL
Qty: 11 TABLET | Refills: 0 | Status: SHIPPED | OUTPATIENT
Start: 2023-12-29 | End: 2024-01-02

## 2023-12-29 RX ORDER — CLARITHROMYCIN 500 MG/1
TABLET, COATED ORAL
COMMUNITY

## 2023-12-29 RX ORDER — ALBUTEROL SULFATE 90 UG/1
2 AEROSOL, METERED RESPIRATORY (INHALATION) EVERY 4 HOURS PRN
COMMUNITY

## 2023-12-29 ASSESSMENT — ENCOUNTER SYMPTOMS
FEVER: 0
WHEEZING: 1
SORE THROAT: 1
SHORTNESS OF BREATH: 0
COUGH: 1
VOMITING: 0
HEMOPTYSIS: 0
DIARRHEA: 0

## 2023-12-30 NOTE — PROGRESS NOTES
Subjective:     Otto Muñiz is a 48 y.o. male who presents for Cough (X5-6days, States its a deep cough, a little bit of fatigue, can taste the yellowness in throat per patient, states he has a little bit of productive cough in the morning, feels little bit of wheezing, )           Cough  This is a new problem. The current episode started in the past 7 days. The problem has been gradually worsening. The cough is Productive of sputum. Associated symptoms include nasal congestion, a sore throat and wheezing. Pertinent negatives include no ear pain, fever, hemoptysis or shortness of breath. Treatments tried: Cough drops.       History reviewed. No pertinent past medical history.    Past Surgical History:   Procedure Laterality Date    NASAL SEPTAL RECONSTRUCTION      x2       Social History     Socioeconomic History    Marital status:      Spouse name: Not on file    Number of children: Not on file    Years of education: Not on file    Highest education level: Not on file   Occupational History    Not on file   Tobacco Use    Smoking status: Never    Smokeless tobacco: Never   Vaping Use    Vaping Use: Never used   Substance and Sexual Activity    Alcohol use: Yes     Comment: rare    Drug use: Never    Sexual activity: Not on file     Comment: ; three boys; wk: construction super   Other Topics Concern    Not on file   Social History Narrative    Not on file     Social Determinants of Health     Financial Resource Strain: Not on file   Food Insecurity: Not on file   Transportation Needs: Not on file   Physical Activity: Not on file   Stress: Not on file   Social Connections: Not on file   Intimate Partner Violence: Not on file   Housing Stability: Not on file        Family History   Problem Relation Age of Onset    Cancer Mother     Hypertension Mother     Heart Disease Father         CHF    Hypertension Brother         No Known Allergies    Review of Systems   Constitutional:  Positive for  "malaise/fatigue. Negative for fever.   HENT:  Positive for sore throat. Negative for ear pain.    Respiratory:  Positive for cough, sputum production and wheezing. Negative for hemoptysis and shortness of breath.    Gastrointestinal:  Negative for diarrhea and vomiting.   All other systems reviewed and are negative.       Objective:   BP (!) 140/80   Pulse 73   Temp 36.3 °C (97.4 °F) (Temporal)   Resp 16   Ht 1.803 m (5' 11\")   Wt 103 kg (227 lb 9.6 oz)   SpO2 97%   BMI 31.74 kg/m²     Physical Exam  Vitals reviewed.   Constitutional:       General: He is not in acute distress.     Appearance: He is well-developed. He is not toxic-appearing.   HENT:      Head: Normocephalic and atraumatic.      Right Ear: Tympanic membrane, ear canal and external ear normal.      Left Ear: Tympanic membrane, ear canal and external ear normal.      Nose: Congestion present.      Mouth/Throat:      Mouth: Mucous membranes are moist.      Pharynx: Posterior oropharyngeal erythema present.   Eyes:      Conjunctiva/sclera: Conjunctivae normal.   Cardiovascular:      Rate and Rhythm: Normal rate.   Pulmonary:      Effort: Pulmonary effort is normal. No respiratory distress.      Breath sounds: No stridor. Wheezing present. No rhonchi or rales.      Comments: Persistent cough.   Musculoskeletal:      Cervical back: Neck supple.   Skin:     General: Skin is warm and dry.      Findings: No rash.   Neurological:      Mental Status: He is alert and oriented to person, place, and time.      GCS: GCS eye subscore is 4. GCS verbal subscore is 5. GCS motor subscore is 6.   Psychiatric:         Speech: Speech normal.         Behavior: Behavior normal.         Thought Content: Thought content normal.         Judgment: Judgment normal.         Assessment/Plan:   1. Exacerbation of asthma, unspecified asthma severity, unspecified whether persistent  - predniSONE (DELTASONE) 20 MG Tab; PO: Take 60 mg today, and 40 mg days 2-5.  Dispense: 11 " Tablet; Refill: 0  - albuterol 108 (90 Base) MCG/ACT Aero Soln inhalation aerosol; Inhale 1-2 Puffs every four hours as needed for Shortness of Breath.  Dispense: 8.5 g; Refill: 0    2. Viral respiratory infection  - benzonatate (TESSALON) 100 MG Cap; Take 1 Capsule by mouth 3 times a day as needed for Cough.  Dispense: 30 Capsule; Refill: 0    Symptomatic care.  -Oral hydration and rest.   -Cough control: nonpharmacologic options for cough relief such as throat lozenges, hot tea, honey.  -Over the counter expectorant as directed; Guaifenesin (Mucinex).  -Tylenol or ibuprofen for pain and fever as directed.   -Warm salt water gargles.  -OTC Throat lozenges or spray (Cepacol).    Seek emergency medical care immediately for: Trouble breathing, persistent pain or pressure in the chest, confusion, inability to wake or stay awake, bluish lips or face, persistent tachycardia (fast heart rate), prolonged dizziness, persistent high grade fevers. Follow up for prolonged cough, persistent wheezing, persistent throat pain, difficulty swallowing, persistent fevers, leg swelling, or any other concerns. Follow up with your Primary Care Provider.     -Discussed viral etiology, with asthma exacerbation, continued symptomatic care; and S&S of PNA with follow up. Stable Vitals. Declined nebulizer tx in clinic for wheezing. Patient opted to not have viral testing, as his son was tested.     Differential diagnosis, natural history, supportive care, and indications for immediate follow-up discussed.

## 2024-01-01 ASSESSMENT — ENCOUNTER SYMPTOMS: SPUTUM PRODUCTION: 1

## 2024-01-09 DIAGNOSIS — I15.9 SECONDARY HYPERTENSION: ICD-10-CM

## 2024-01-10 RX ORDER — LOSARTAN POTASSIUM 50 MG/1
50 TABLET ORAL DAILY
Qty: 90 TABLET | Refills: 3 | Status: SHIPPED | OUTPATIENT
Start: 2024-01-10

## 2024-05-20 DIAGNOSIS — I15.9 SECONDARY HYPERTENSION: ICD-10-CM

## 2024-05-20 RX ORDER — LOSARTAN POTASSIUM 100 MG/1
100 TABLET ORAL DAILY
Qty: 30 TABLET | Refills: 0 | Status: SHIPPED | OUTPATIENT
Start: 2024-05-20

## 2024-06-24 DIAGNOSIS — I15.9 SECONDARY HYPERTENSION: ICD-10-CM

## 2024-06-24 RX ORDER — LOSARTAN POTASSIUM 100 MG/1
100 TABLET ORAL DAILY
Qty: 90 TABLET | Refills: 1 | Status: SHIPPED | OUTPATIENT
Start: 2024-06-24

## 2024-06-24 NOTE — TELEPHONE ENCOUNTER
Received request via: Pharmacy    Was the patient seen in the last year in this department? No  LOV : 1/18/2023   Does the patient have an active prescription (recently filled or refills available) for medication(s) requested? No    Pharm[facility] Name: [facility]     Does the patient have longterm Plus and need 100 day supply (blood pressure, diabetes and cholesterol meds only)? Patient does not have SCP

## 2024-09-24 ENCOUNTER — OFFICE VISIT (OUTPATIENT)
Dept: MEDICAL GROUP | Facility: LAB | Age: 49
End: 2024-09-24
Payer: COMMERCIAL

## 2024-09-24 VITALS
WEIGHT: 227 LBS | TEMPERATURE: 97.7 F | RESPIRATION RATE: 16 BRPM | HEIGHT: 71 IN | BODY MASS INDEX: 31.78 KG/M2 | DIASTOLIC BLOOD PRESSURE: 76 MMHG | HEART RATE: 62 BPM | OXYGEN SATURATION: 97 % | SYSTOLIC BLOOD PRESSURE: 124 MMHG

## 2024-09-24 DIAGNOSIS — Z12.12 SCREENING FOR COLORECTAL CANCER: ICD-10-CM

## 2024-09-24 DIAGNOSIS — Z12.11 SCREENING FOR COLORECTAL CANCER: ICD-10-CM

## 2024-09-24 DIAGNOSIS — Z00.00 WELL ADULT EXAM: ICD-10-CM

## 2024-09-24 DIAGNOSIS — I10 ESSENTIAL HYPERTENSION: ICD-10-CM

## 2024-09-24 PROCEDURE — 99396 PREV VISIT EST AGE 40-64: CPT | Performed by: NURSE PRACTITIONER

## 2024-09-24 PROCEDURE — 3078F DIAST BP <80 MM HG: CPT | Performed by: NURSE PRACTITIONER

## 2024-09-24 PROCEDURE — 3074F SYST BP LT 130 MM HG: CPT | Performed by: NURSE PRACTITIONER

## 2024-09-24 RX ORDER — LOSARTAN POTASSIUM 100 MG/1
100 TABLET ORAL DAILY
Qty: 90 TABLET | Refills: 3 | Status: SHIPPED | OUTPATIENT
Start: 2024-09-24

## 2024-09-24 ASSESSMENT — PATIENT HEALTH QUESTIONNAIRE - PHQ9: CLINICAL INTERPRETATION OF PHQ2 SCORE: 0

## 2024-09-24 NOTE — PROGRESS NOTES
Subjective:     CC:   Chief Complaint   Patient presents with    Annual Exam     Verbal consent was acquired by the patient to use [employer] ambient listening note generation during this visit Yes     History of Present Illness  The patient is a 49-year-old male who presents for annual exam.  He has remained healthy throughout the year.    He reports no issues with bowel movements or urination. He does not wake up more than once at night to urinate and has no issues with his urinary stream. He has not undergone a colonoscopy and has no family hx of colon CA.  He contracted COVID-19 once last year but recovered within three days. He reports no allergy-related issues.    He has a history of asthma and uses an inhaler as needed. He experiences wheezing during the winter months and is under the care of a pulmonologist. He was previously treated with a Medrol Dosepak which helps.    He had a nose surgery.    SOCIAL HISTORY  He never smokes. He rarely drinks.    FAMILY HISTORY  He denies family history of prostate cancer or diabetes. His mother had high blood pressure and breast cancer. His father  of cardiomyopathy. His son has asthma. He denies family history of colon cancer. His sister is healthy without any major medical problems. His brother has high blood pressure and he smokes.    ALLERGIES  He has no known drug allergies.        He  has no past medical history on file.  He  has a past surgical history that includes nasal septal reconstruction.    Family History   Problem Relation Age of Onset    Cancer Mother         breast    Hypertension Mother     Heart Disease Father         CHF    Hypertension Brother      Social History     Tobacco Use    Smoking status: Never    Smokeless tobacco: Never   Vaping Use    Vaping status: Never Used   Substance Use Topics    Alcohol use: Yes     Comment: rare    Drug use: Never     He  has no history on file for sexual activity.    Patient Active Problem List    Diagnosis  "Date Noted    Other hyperlipidemia 01/18/2023    Essential hypertension 01/30/2020    Obesity (BMI 30-39.9) 12/20/2017    Reactive airway disease - responds quickly to steroids 08/01/2016    Exposure to environmental hazard 08/01/2016     Current Outpatient Medications   Medication Sig Dispense Refill    losartan (COZAAR) 100 MG Tab TAKE 1 TABLET BY MOUTH EVERY DAY 90 Tablet 1    albuterol 108 (90 Base) MCG/ACT Aero Soln inhalation aerosol Inhale 1-2 Puffs every four hours as needed for Shortness of Breath. 8.5 g 0     No current facility-administered medications for this visit.     No Known Allergies    Review of Systems   Constitutional: Negative for fever, chills and malaise/fatigue.   HENT: Negative for congestion.    Eyes: Negative for pain.   Respiratory: Negative for cough and shortness of breath.    Cardiovascular: Negative for chest pain and leg swelling.   Gastrointestinal: Negative for nausea, vomiting, abdominal pain and diarrhea.   Genitourinary: Negative for dysuria and hematuria.   Skin: Negative for rash.   Neurological: Negative for dizziness, focal weakness and headaches.   Endo/Heme/Allergies: Does not bruise/bleed easily.   Psychiatric/Behavioral: Negative for depression.  The patient is not nervous/anxious.      Objective:   /76 (BP Location: Left arm, Patient Position: Sitting, BP Cuff Size: Adult)   Pulse 62   Temp 36.5 °C (97.7 °F)   Resp 16   Ht 1.803 m (5' 11\")   Wt 103 kg (227 lb)   SpO2 97%   BMI 31.66 kg/m²      Wt Readings from Last 4 Encounters:   09/24/24 103 kg (227 lb)   12/29/23 103 kg (227 lb 9.6 oz)   01/18/23 101 kg (223 lb)   12/13/22 102 kg (225 lb)     Physical Exam:  Constitutional: Well-developed and well-nourished. Not diaphoretic. No distress.   Skin: Skin is warm and dry. No rash noted. Circular, scaling lesion to dorsal aspect of left hand - measuring about 1 cm.   Head: Atraumatic without lesions.  Eyes: Conjunctivae and extraocular motions are normal. " Pupils are equal, round, and reactive to light. No scleral icterus.   Ears:  External ears unremarkable. Tympanic membranes clear and intact.  Nose: Nares patent. Septum midline. Turbinates without erythema nor edema. No discharge.   Mouth/Throat: Tongue normal. Oropharynx is clear and moist. Posterior pharynx without erythema or exudates.  Neck: Supple, trachea midline. Normal range of motion. No thyromegaly present. No lymphadenopathy--cervical or supraclavicular.  Cardiovascular: Regular rate and rhythm, S1 and S2 without murmur, rubs, or gallops.    Respiratory: Effort normal. Clear to auscultation throughout. No adventitious sounds.   Abdomen: Soft, non tender, and without distention. Active bowel sounds in all four quadrants. No rebound, guarding, masses or HSM.  Extremities: No cyanosis, clubbing, erythema, nor edema. Distal pulses intact and symmetric.   Musculoskeletal: All major joints AROM full in all directions without pain.  Neurological: Alert and oriented x 3. Grossly non-focal. Strength and sensation grossly intact. DTRs 2+/3 and symmetric.   Psychiatric:  Behavior, mood, and affect are appropriate.      Assessment and Plan:     1. Well adult exam  - Lipid Profile; Future  - PROSTATE SPECIFIC AG SCREENING; Future  - VITAMIN D 25-HYDROXY; Future  - Comp Metabolic Panel; Future  - CBC WITH DIFFERENTIAL; Future  - HEMOGLOBIN A1C; Future    2. Screening for colorectal cancer  - Referral to GI for Colonoscopy    3.  HTN:  well controlled.  Refilled losartan 100 mg daily.     Has appt coming up with dermatology.   Patient counseled about skin care, diet, supplements, and exercise.  Discussed diet and exercise, colorectal cancer screening, adequate intake of calcium and vitamin D, and prostate cancer screening   Anticipatory guidance:  Encouraged daily physical exercise, high fiber / vegetable based diet, 8 hours of sleep at night, skin protection from sun with suncreen / clothing, yearly derm consults.      Follow-up: 1 yr

## 2024-10-02 ENCOUNTER — OFFICE VISIT (OUTPATIENT)
Dept: DERMATOLOGY | Facility: IMAGING CENTER | Age: 49
End: 2024-10-02
Payer: COMMERCIAL

## 2024-10-02 DIAGNOSIS — L72.0 EPIDERMAL CYST: ICD-10-CM

## 2024-10-02 DIAGNOSIS — L82.1 SK (SEBORRHEIC KERATOSIS): ICD-10-CM

## 2024-10-02 PROCEDURE — 99213 OFFICE O/P EST LOW 20 MIN: CPT | Performed by: NURSE PRACTITIONER

## 2024-10-30 VITALS
SYSTOLIC BLOOD PRESSURE: 118 MMHG | TEMPERATURE: 96.9 F | WEIGHT: 220 LBS | DIASTOLIC BLOOD PRESSURE: 70 MMHG | BODY MASS INDEX: 30.8 KG/M2 | HEIGHT: 71 IN

## 2024-10-30 DIAGNOSIS — D48.5 NEOPLASM OF UNCERTAIN BEHAVIOR OF SKIN: ICD-10-CM

## 2024-11-04 ENCOUNTER — PATIENT MESSAGE (OUTPATIENT)
Dept: DERMATOLOGY | Facility: IMAGING CENTER | Age: 49
End: 2024-11-04
Payer: COMMERCIAL

## 2024-11-06 ENCOUNTER — NON-PROVIDER VISIT (OUTPATIENT)
Dept: DERMATOLOGY | Facility: IMAGING CENTER | Age: 49
End: 2024-11-06
Payer: COMMERCIAL

## 2024-11-06 NOTE — PROGRESS NOTES
Otto Muñiz is a 49 y.o. male here for a Non-Provider Visit for Suture Removal.    Sutures were placed by Dr. Ontiveros  on date: 10/02/2024  Skin is healed: Yes  Provider notified if skin is not healed, or if there is redness, heat, pain, or drainage from incision: N\A  Sutures removed. Well healed   Mastisol and steristips are placed: No    Advised to use emollient (vaseline, aquaphor, etc.) as needed, avoid peroxide and antibiotic ointment to reduce irritation.     Path report has been reviewed by provider.  Path report has reviewed with patient.

## 2025-02-11 ENCOUNTER — OFFICE VISIT (OUTPATIENT)
Dept: MEDICAL GROUP | Facility: LAB | Age: 50
End: 2025-02-11
Payer: COMMERCIAL

## 2025-02-11 VITALS
OXYGEN SATURATION: 98 % | HEIGHT: 71 IN | BODY MASS INDEX: 32.48 KG/M2 | WEIGHT: 232 LBS | HEART RATE: 66 BPM | SYSTOLIC BLOOD PRESSURE: 128 MMHG | TEMPERATURE: 96.4 F | DIASTOLIC BLOOD PRESSURE: 78 MMHG | RESPIRATION RATE: 12 BRPM

## 2025-02-11 DIAGNOSIS — J45.21 MILD INTERMITTENT REACTIVE AIRWAY DISEASE WITH ACUTE EXACERBATION: ICD-10-CM

## 2025-02-11 DIAGNOSIS — J20.9 ACUTE BRONCHITIS, UNSPECIFIED ORGANISM: ICD-10-CM

## 2025-02-11 PROCEDURE — 3074F SYST BP LT 130 MM HG: CPT | Performed by: NURSE PRACTITIONER

## 2025-02-11 PROCEDURE — 99214 OFFICE O/P EST MOD 30 MIN: CPT | Performed by: NURSE PRACTITIONER

## 2025-02-11 PROCEDURE — 3078F DIAST BP <80 MM HG: CPT | Performed by: NURSE PRACTITIONER

## 2025-02-11 RX ORDER — ALBUTEROL SULFATE 0.83 MG/ML
2.5 SOLUTION RESPIRATORY (INHALATION) EVERY 4 HOURS PRN
Qty: 30 EACH | Refills: 1 | Status: SHIPPED | OUTPATIENT
Start: 2025-02-11

## 2025-02-11 RX ORDER — MONTELUKAST SODIUM 10 MG/1
10 TABLET ORAL DAILY
Qty: 30 TABLET | Refills: 0 | Status: SHIPPED | OUTPATIENT
Start: 2025-02-11

## 2025-02-11 ASSESSMENT — PATIENT HEALTH QUESTIONNAIRE - PHQ9: CLINICAL INTERPRETATION OF PHQ2 SCORE: 0

## 2025-02-12 ENCOUNTER — RESULTS FOLLOW-UP (OUTPATIENT)
Dept: MEDICAL GROUP | Facility: LAB | Age: 50
End: 2025-02-12

## 2025-02-12 ENCOUNTER — APPOINTMENT (OUTPATIENT)
Dept: RADIOLOGY | Facility: MEDICAL CENTER | Age: 50
End: 2025-02-12
Attending: NURSE PRACTITIONER
Payer: COMMERCIAL

## 2025-02-12 DIAGNOSIS — J20.9 ACUTE BRONCHITIS, UNSPECIFIED ORGANISM: ICD-10-CM

## 2025-02-12 PROCEDURE — 71046 X-RAY EXAM CHEST 2 VIEWS: CPT

## 2025-02-12 NOTE — PROGRESS NOTES
Verbal consent was acquired by the patient to use [employer] ambient listening note generation during this visit Yes      Subjective   Otto Muñiz is a 49 y.o. male who presents for cough  History of Present Illness  The patient presents for evaluation of a persistent cough.  Known history of asthma.    Keeps a prednisone prescription on hand to use for flareups which he did take, finishing a few days ago and cough is persistent.  His wife is here with him today and states that his cough is very disturbing sounding.  URI symptoms began about 2-1/2 weeks ago.  The cough is described as dry and seal-like, with associated symptoms of a mild congestion and sore throat.  He reports no significant mucus production, except for today, which he attributes to outdoor exposure. He also reports experiencing dyspnea during coughing episodes. He has been using over-the-counter medications such as DayQuil, NyQuil, and [place], which provide temporary relief. The cough is particularly bothersome at night and when lying down or speaking. He reports no sinus pain but does experience ear popping without associated pain. He does not take any allergy medications such as Claritin, Allegra, or Zyrtec. He experienced throat pain during the first week of his illness but has not had any episodes of vomiting or diarrhea. He did experience constipation while on prednisone, but his bowel movements have since returned to normal. He does not feel ill at present. He has not previously taken Singulair. He has undergone pulmonary function tests in the past, which revealed mild asthma. He rarely requires albuterol when he is not ill. Despite completing a course of prednisone, tapering from 4 tablets to 1 over a period of 10 days, there has been no improvement in his symptoms. He has an albuterol inhaler, which provides relief for approximately 30 minutes, and a nebulizer machine at home, although he has not used it recently due to a lack  "of albuterol.    FAMILY HISTORY  His father had cardiomyopathy and asthma.      Review of Systems  Negative except for HPI  Objective   /78 (BP Location: Right arm, Patient Position: Sitting, BP Cuff Size: Large adult)   Pulse 66   Temp (!) 35.8 °C (96.4 °F)   Resp 12   Ht 1.803 m (5' 11\")   Wt 105 kg (232 lb)   SpO2 98%   Physical Exam  Gen. appears healthy in no distress   Skin appropriate for sex and age   Neck trachea is midline  ENT: Tympanic membranes translucent bilaterally.  No sinus tenderness.  Oropharynx slightly injected without exudate or edema.  Lungs unlabored breathing.  Lungs are clear to auscultation bilaterally.  He does have a congested sounding cough in the room.  Heart regular rate and rhythm  Neuro gait and station normal   Psych appropriate, calm, interactive, well-groomed         Assessment & Plan  1. Bronchitis.  Recommend x-ray to rule out pneumonia.  Does not feel ill, refrain from antibiotics.  Encouraged albuterol 1 to 2 puffs every 4-6 hours as needed for shortness of breath or wheezing.  Refilled albuterol bullets for nebulizer machine.  I will follow-up with him with chest x-ray results as soon as they return.    2. Reactive airway disease/asthma.  Chronic with exacerbation, requiring initiation of maintenance medication for a few weeks, Singulair.  Because of his lack of response to prednisone, recommend Singulair 10 mg nightly for the next 2 to 3 weeks.  He will follow-up with me via MyChart if his cough is not improving.  Reminded him of side effects and how to take Singulair as well as potential for vivid dreams.  Continue albuterol as needed.               Please note that this dictation was created using voice recognition software. I have made every reasonable attempt to correct obvious errors, but I expect that there are errors of grammar and possibly content that I did not discover before finalizing the note.  "

## 2025-03-06 DIAGNOSIS — J45.21 MILD INTERMITTENT REACTIVE AIRWAY DISEASE WITH ACUTE EXACERBATION: ICD-10-CM

## 2025-03-06 RX ORDER — MONTELUKAST SODIUM 10 MG/1
10 TABLET ORAL DAILY
Qty: 90 TABLET | Refills: 1 | Status: SHIPPED | OUTPATIENT
Start: 2025-03-06

## 2025-06-05 ENCOUNTER — OFFICE VISIT (OUTPATIENT)
Dept: MEDICAL GROUP | Facility: LAB | Age: 50
End: 2025-06-05
Payer: COMMERCIAL

## 2025-06-05 VITALS
TEMPERATURE: 97.3 F | WEIGHT: 227 LBS | BODY MASS INDEX: 31.78 KG/M2 | DIASTOLIC BLOOD PRESSURE: 82 MMHG | OXYGEN SATURATION: 96 % | HEIGHT: 71 IN | HEART RATE: 66 BPM | SYSTOLIC BLOOD PRESSURE: 120 MMHG

## 2025-06-05 DIAGNOSIS — E78.49 OTHER HYPERLIPIDEMIA: Primary | ICD-10-CM

## 2025-06-05 DIAGNOSIS — J45.20 MILD INTERMITTENT REACTIVE AIRWAY DISEASE WITHOUT COMPLICATION: ICD-10-CM

## 2025-06-05 DIAGNOSIS — E66.9 OBESITY (BMI 30-39.9): ICD-10-CM

## 2025-06-05 DIAGNOSIS — I10 ESSENTIAL HYPERTENSION: ICD-10-CM

## 2025-06-05 PROCEDURE — 3079F DIAST BP 80-89 MM HG: CPT | Performed by: NURSE PRACTITIONER

## 2025-06-05 PROCEDURE — 3074F SYST BP LT 130 MM HG: CPT | Performed by: NURSE PRACTITIONER

## 2025-06-05 PROCEDURE — 99214 OFFICE O/P EST MOD 30 MIN: CPT | Performed by: NURSE PRACTITIONER

## 2025-06-05 NOTE — PROGRESS NOTES
"Verbal consent was acquired by the patient to use [employer] ambient listening note generation during this visit Yes      Subjective   Otto Lionel Muñiz is a 50 y.o. male who presents for follow-up on chronic issues and paperwork to go to [employer] for [facility] in [place].  History of Present Illness  The patient is a 50-year-old established male here to follow up on chronic issues and complete some paperwork.  He is feeling well.  Very physically active.  Will be walking 4 to 5 miles per day for 7 days.  Denies any specific joint pains.  No chest pain or trouble breathing.  He reports no known allergies to medications, food, plants, or insects, and has not experienced any severe reactions to insect bites. His immunization record is not available at this time. He has been engaging in physical training for [place], including hiking approximately 4 miles daily. He does not possess an EpiPen nor has he ever needed 1.  He denies any anaphylactic reactions in his past.  He reports no medical restrictions that would prevent him from participating in activities. His weight fluctuates significantly, with a recent measurement of 222 pounds on his home scale, taken in the afternoon after skipping dinner and breakfast. He does not use corrective lenses such as contacts or glasses. He has no plans for scuba diving. He reports no history of uncontrolled heart disease, lung disease, or hypertension. He has not undergone any orthopedic surgery or sustained injuries within the past 6 months. He reports no history of uncontrolled psychiatric disorders.  He reports normal bowel and bladder function. He reports no joint issues. He is currently on losartan.      Objective   /82 (BP Location: Left arm, Patient Position: Sitting, BP Cuff Size: Large adult)   Pulse 66   Temp 36.3 °C (97.3 °F)   Ht 1.803 m (5' 11\")   Wt 103 kg (227 lb)   SpO2 96%   Physical Exam  Mouth/Throat: Mucous membranes moist, no erythema, no " "exudate  Respiratory: Clear to auscultation, no wheezing, rales or rhonchi  Cardiovascular: Regular rate and rhythm, no murmurs, rubs, or gallops  Gastrointestinal: Soft, no tenderness, no distention, no masses  Musculoskeletal: No joint or muscular abnormalities noted  Gen:  NAD, WD       Assessment & Plan  \"  1. Other hyperlipidemia        2. Essential hypertension        3. Mild intermittent reactive airway disease without complication        4. Obesity (BMI 30-39.9)        \"  Overall Otto is doing well.  Paperwork completed and he is cleared to participate in [email or web address].  Blood pressure well-controlled.  Continue losartan 100 mg.  Has not had any recent issues with mild asthma.  Encouraged him to bring his inhaler in case he needs it.  He has no anaphylactic reaction history and does not need an EpiPen.  I encouraged him to continue to work on weight loss, high-fiber diet, limiting white carbohydrates, limiting fatty animal protein and eat as vegetable-based as possible.  Encouraged about 30 g of fiber per day.  Encouraged him to have his labs drawn.  Follow-up September for annual exam.               Please note that this dictation was created using voice recognition software. I have made every reasonable attempt to correct obvious errors, but I expect that there are errors of grammar and possibly content that I did not discover before finalizing the note.  "

## 2025-06-18 ENCOUNTER — HOSPITAL ENCOUNTER (OUTPATIENT)
Dept: LAB | Facility: MEDICAL CENTER | Age: 50
End: 2025-06-18
Attending: NURSE PRACTITIONER
Payer: COMMERCIAL

## 2025-06-18 ENCOUNTER — RESULTS FOLLOW-UP (OUTPATIENT)
Dept: MEDICAL GROUP | Facility: LAB | Age: 50
End: 2025-06-18

## 2025-06-18 DIAGNOSIS — Z00.00 WELL ADULT EXAM: ICD-10-CM

## 2025-06-18 LAB
25(OH)D3 SERPL-MCNC: 28 NG/ML (ref 30–100)
ALBUMIN SERPL BCP-MCNC: 4.3 G/DL (ref 3.2–4.9)
ALBUMIN/GLOB SERPL: 1.5 G/DL
ALP SERPL-CCNC: 75 U/L (ref 30–99)
ALT SERPL-CCNC: 50 U/L (ref 2–50)
ANION GAP SERPL CALC-SCNC: 13 MMOL/L (ref 7–16)
AST SERPL-CCNC: 32 U/L (ref 12–45)
BASOPHILS # BLD AUTO: 1.1 % (ref 0–1.8)
BASOPHILS # BLD: 0.07 K/UL (ref 0–0.12)
BILIRUB SERPL-MCNC: 0.5 MG/DL (ref 0.1–1.5)
BUN SERPL-MCNC: 20 MG/DL (ref 8–22)
CALCIUM ALBUM COR SERPL-MCNC: 9.1 MG/DL (ref 8.5–10.5)
CALCIUM SERPL-MCNC: 9.3 MG/DL (ref 8.5–10.5)
CHLORIDE SERPL-SCNC: 108 MMOL/L (ref 96–112)
CHOLEST SERPL-MCNC: 198 MG/DL (ref 100–199)
CO2 SERPL-SCNC: 21 MMOL/L (ref 20–33)
CREAT SERPL-MCNC: 1 MG/DL (ref 0.5–1.4)
EOSINOPHIL # BLD AUTO: 0.16 K/UL (ref 0–0.51)
EOSINOPHIL NFR BLD: 2.6 % (ref 0–6.9)
ERYTHROCYTE [DISTWIDTH] IN BLOOD BY AUTOMATED COUNT: 42.1 FL (ref 35.9–50)
EST. AVERAGE GLUCOSE BLD GHB EST-MCNC: 114 MG/DL
GFR SERPLBLD CREATININE-BSD FMLA CKD-EPI: 93 ML/MIN/1.73 M 2
GLOBULIN SER CALC-MCNC: 2.8 G/DL (ref 1.9–3.5)
GLUCOSE SERPL-MCNC: 110 MG/DL (ref 65–99)
HBA1C MFR BLD: 5.6 % (ref 4–5.6)
HCT VFR BLD AUTO: 48.8 % (ref 42–52)
HDLC SERPL-MCNC: 33 MG/DL
HGB BLD-MCNC: 15.7 G/DL (ref 14–18)
IMM GRANULOCYTES # BLD AUTO: 0.01 K/UL (ref 0–0.11)
IMM GRANULOCYTES NFR BLD AUTO: 0.2 % (ref 0–0.9)
LDLC SERPL CALC-MCNC: 133 MG/DL
LYMPHOCYTES # BLD AUTO: 1.73 K/UL (ref 1–4.8)
LYMPHOCYTES NFR BLD: 27.7 % (ref 22–41)
MCH RBC QN AUTO: 30.1 PG (ref 27–33)
MCHC RBC AUTO-ENTMCNC: 32.2 G/DL (ref 32.3–36.5)
MCV RBC AUTO: 93.7 FL (ref 81.4–97.8)
MONOCYTES # BLD AUTO: 0.78 K/UL (ref 0–0.85)
MONOCYTES NFR BLD AUTO: 12.5 % (ref 0–13.4)
NEUTROPHILS # BLD AUTO: 3.5 K/UL (ref 1.82–7.42)
NEUTROPHILS NFR BLD: 55.9 % (ref 44–72)
NRBC # BLD AUTO: 0 K/UL
NRBC BLD-RTO: 0 /100 WBC (ref 0–0.2)
PLATELET # BLD AUTO: 323 K/UL (ref 164–446)
PMV BLD AUTO: 9.3 FL (ref 9–12.9)
POTASSIUM SERPL-SCNC: 4.4 MMOL/L (ref 3.6–5.5)
PROT SERPL-MCNC: 7.1 G/DL (ref 6–8.2)
PSA SERPL DL<=0.01 NG/ML-MCNC: 1.3 NG/ML (ref 0–4)
RBC # BLD AUTO: 5.21 M/UL (ref 4.7–6.1)
SODIUM SERPL-SCNC: 142 MMOL/L (ref 135–145)
TRIGL SERPL-MCNC: 159 MG/DL (ref 0–149)
WBC # BLD AUTO: 6.3 K/UL (ref 4.8–10.8)

## 2025-06-18 PROCEDURE — 80053 COMPREHEN METABOLIC PANEL: CPT

## 2025-06-18 PROCEDURE — 85025 COMPLETE CBC W/AUTO DIFF WBC: CPT

## 2025-06-18 PROCEDURE — 80061 LIPID PANEL: CPT

## 2025-06-18 PROCEDURE — 83036 HEMOGLOBIN GLYCOSYLATED A1C: CPT

## 2025-06-18 PROCEDURE — 82306 VITAMIN D 25 HYDROXY: CPT

## 2025-06-18 PROCEDURE — 36415 COLL VENOUS BLD VENIPUNCTURE: CPT

## 2025-06-18 PROCEDURE — 84153 ASSAY OF PSA TOTAL: CPT
